# Patient Record
Sex: FEMALE | Race: WHITE | NOT HISPANIC OR LATINO | Employment: UNEMPLOYED | URBAN - METROPOLITAN AREA
[De-identification: names, ages, dates, MRNs, and addresses within clinical notes are randomized per-mention and may not be internally consistent; named-entity substitution may affect disease eponyms.]

---

## 2023-07-15 ENCOUNTER — HOSPITAL ENCOUNTER (EMERGENCY)
Facility: HOSPITAL | Age: 48
Discharge: HOME | End: 2023-07-15
Attending: EMERGENCY MEDICINE
Payer: COMMERCIAL

## 2023-07-15 VITALS
SYSTOLIC BLOOD PRESSURE: 156 MMHG | DIASTOLIC BLOOD PRESSURE: 89 MMHG | HEIGHT: 64 IN | BODY MASS INDEX: 41.83 KG/M2 | TEMPERATURE: 98 F | WEIGHT: 245 LBS | HEART RATE: 91 BPM | RESPIRATION RATE: 18 BRPM | OXYGEN SATURATION: 100 %

## 2023-07-15 DIAGNOSIS — L02.619 CELLULITIS AND ABSCESS OF FOOT: Primary | ICD-10-CM

## 2023-07-15 DIAGNOSIS — L03.119 CELLULITIS AND ABSCESS OF FOOT: Primary | ICD-10-CM

## 2023-07-15 DIAGNOSIS — I10 HYPERTENSION: ICD-10-CM

## 2023-07-15 DIAGNOSIS — M79.673 FOOT PAIN: ICD-10-CM

## 2023-07-15 LAB
ALBUMIN SERPL BCP-MCNC: 4.4 G/DL (ref 3.5–5.2)
ALP SERPL-CCNC: 89 U/L (ref 55–135)
ALT SERPL W/O P-5'-P-CCNC: 18 U/L (ref 10–44)
ANION GAP SERPL CALC-SCNC: 4 MMOL/L (ref 8–16)
AST SERPL-CCNC: 17 U/L (ref 10–40)
BASOPHILS # BLD AUTO: 0.09 K/UL (ref 0–0.2)
BASOPHILS NFR BLD: 0.7 % (ref 0–1.9)
BILIRUB SERPL-MCNC: 0.3 MG/DL (ref 0.1–1)
BILIRUB UR QL STRIP: NEGATIVE
BUN SERPL-MCNC: 16 MG/DL (ref 6–20)
CALCIUM SERPL-MCNC: 8.9 MG/DL (ref 8.7–10.5)
CHLORIDE SERPL-SCNC: 103 MMOL/L (ref 95–110)
CLARITY UR: CLEAR
CO2 SERPL-SCNC: 29 MMOL/L (ref 23–29)
COLOR UR: YELLOW
CREAT SERPL-MCNC: 0.7 MG/DL (ref 0.5–1.4)
DIFFERENTIAL METHOD: ABNORMAL
EOSINOPHIL # BLD AUTO: 0.3 K/UL (ref 0–0.5)
EOSINOPHIL NFR BLD: 2.5 % (ref 0–8)
ERYTHROCYTE [DISTWIDTH] IN BLOOD BY AUTOMATED COUNT: 13.2 % (ref 11.5–14.5)
EST. GFR  (NO RACE VARIABLE): >60 ML/MIN/1.73 M^2
GLUCOSE SERPL-MCNC: 108 MG/DL (ref 70–110)
GLUCOSE SERPL-MCNC: 113 MG/DL (ref 70–110)
GLUCOSE UR QL STRIP: NEGATIVE
HCT VFR BLD AUTO: 43.5 % (ref 37–48.5)
HGB BLD-MCNC: 14 G/DL (ref 12–16)
HGB UR QL STRIP: NEGATIVE
IMM GRANULOCYTES # BLD AUTO: 0.06 K/UL (ref 0–0.04)
IMM GRANULOCYTES NFR BLD AUTO: 0.5 % (ref 0–0.5)
INFLUENZA A, MOLECULAR: NEGATIVE
INFLUENZA B, MOLECULAR: NEGATIVE
KETONES UR QL STRIP: NEGATIVE
LACTATE SERPL-SCNC: 1.1 MMOL/L (ref 0.5–1.9)
LEUKOCYTE ESTERASE UR QL STRIP: NEGATIVE
LYMPHOCYTES # BLD AUTO: 3.3 K/UL (ref 1–4.8)
LYMPHOCYTES NFR BLD: 26 % (ref 18–48)
MAGNESIUM SERPL-MCNC: 1.8 MG/DL (ref 1.6–2.6)
MCH RBC QN AUTO: 28.9 PG (ref 27–31)
MCHC RBC AUTO-ENTMCNC: 32.2 G/DL (ref 32–36)
MCV RBC AUTO: 90 FL (ref 82–98)
MONOCYTES # BLD AUTO: 1 K/UL (ref 0.3–1)
MONOCYTES NFR BLD: 7.8 % (ref 4–15)
NEUTROPHILS # BLD AUTO: 8 K/UL (ref 1.8–7.7)
NEUTROPHILS NFR BLD: 62.5 % (ref 38–73)
NITRITE UR QL STRIP: NEGATIVE
NRBC BLD-RTO: 0 /100 WBC
PH UR STRIP: 6 [PH] (ref 5–8)
PLATELET # BLD AUTO: 284 K/UL (ref 150–450)
PMV BLD AUTO: 10.8 FL (ref 9.2–12.9)
POTASSIUM SERPL-SCNC: 3.9 MMOL/L (ref 3.5–5.1)
PROT SERPL-MCNC: 8.1 G/DL (ref 6–8.4)
PROT UR QL STRIP: ABNORMAL
RBC # BLD AUTO: 4.85 M/UL (ref 4–5.4)
SARS-COV-2 RDRP RESP QL NAA+PROBE: NEGATIVE
SODIUM SERPL-SCNC: 136 MMOL/L (ref 136–145)
SP GR UR STRIP: >1.03 (ref 1–1.03)
SPECIMEN SOURCE: NORMAL
TROPONIN I SERPL HS-MCNC: 2.3 PG/ML (ref 0–14.9)
TSH SERPL DL<=0.005 MIU/L-ACNC: 3.18 UIU/ML (ref 0.34–5.6)
URN SPEC COLLECT METH UR: ABNORMAL
UROBILINOGEN UR STRIP-ACNC: NEGATIVE EU/DL
WBC # BLD AUTO: 12.8 K/UL (ref 3.9–12.7)

## 2023-07-15 PROCEDURE — 83735 ASSAY OF MAGNESIUM: CPT | Performed by: EMERGENCY MEDICINE

## 2023-07-15 PROCEDURE — 87186 SC STD MICRODIL/AGAR DIL: CPT | Performed by: EMERGENCY MEDICINE

## 2023-07-15 PROCEDURE — 87147 CULTURE TYPE IMMUNOLOGIC: CPT | Performed by: EMERGENCY MEDICINE

## 2023-07-15 PROCEDURE — 80053 COMPREHEN METABOLIC PANEL: CPT | Performed by: EMERGENCY MEDICINE

## 2023-07-15 PROCEDURE — 84443 ASSAY THYROID STIM HORMONE: CPT | Performed by: EMERGENCY MEDICINE

## 2023-07-15 PROCEDURE — 87077 CULTURE AEROBIC IDENTIFY: CPT | Performed by: EMERGENCY MEDICINE

## 2023-07-15 PROCEDURE — 87502 INFLUENZA DNA AMP PROBE: CPT | Performed by: EMERGENCY MEDICINE

## 2023-07-15 PROCEDURE — 87040 BLOOD CULTURE FOR BACTERIA: CPT | Performed by: EMERGENCY MEDICINE

## 2023-07-15 PROCEDURE — 84484 ASSAY OF TROPONIN QUANT: CPT | Performed by: EMERGENCY MEDICINE

## 2023-07-15 PROCEDURE — 93005 ELECTROCARDIOGRAM TRACING: CPT | Performed by: INTERNAL MEDICINE

## 2023-07-15 PROCEDURE — 93010 EKG 12-LEAD: ICD-10-PCS | Mod: ,,, | Performed by: INTERNAL MEDICINE

## 2023-07-15 PROCEDURE — 36415 COLL VENOUS BLD VENIPUNCTURE: CPT | Performed by: EMERGENCY MEDICINE

## 2023-07-15 PROCEDURE — 99285 EMERGENCY DEPT VISIT HI MDM: CPT | Mod: 25

## 2023-07-15 PROCEDURE — 81003 URINALYSIS AUTO W/O SCOPE: CPT | Performed by: EMERGENCY MEDICINE

## 2023-07-15 PROCEDURE — 85025 COMPLETE CBC W/AUTO DIFF WBC: CPT | Performed by: EMERGENCY MEDICINE

## 2023-07-15 PROCEDURE — 93010 ELECTROCARDIOGRAM REPORT: CPT | Mod: ,,, | Performed by: INTERNAL MEDICINE

## 2023-07-15 PROCEDURE — 83605 ASSAY OF LACTIC ACID: CPT | Performed by: EMERGENCY MEDICINE

## 2023-07-15 PROCEDURE — 87070 CULTURE OTHR SPECIMN AEROBIC: CPT | Performed by: EMERGENCY MEDICINE

## 2023-07-15 PROCEDURE — U0002 COVID-19 LAB TEST NON-CDC: HCPCS | Performed by: EMERGENCY MEDICINE

## 2023-07-15 PROCEDURE — 25000003 PHARM REV CODE 250: Performed by: EMERGENCY MEDICINE

## 2023-07-15 PROCEDURE — 96374 THER/PROPH/DIAG INJ IV PUSH: CPT

## 2023-07-15 PROCEDURE — 82962 GLUCOSE BLOOD TEST: CPT

## 2023-07-15 PROCEDURE — 63600175 PHARM REV CODE 636 W HCPCS: Performed by: EMERGENCY MEDICINE

## 2023-07-15 RX ORDER — DOXYCYCLINE 100 MG/1
100 CAPSULE ORAL EVERY 12 HOURS
Status: DISCONTINUED | OUTPATIENT
Start: 2023-07-15 | End: 2023-07-15 | Stop reason: HOSPADM

## 2023-07-15 RX ORDER — GABAPENTIN 300 MG/1
300 CAPSULE ORAL 3 TIMES DAILY
Status: DISCONTINUED | OUTPATIENT
Start: 2023-07-15 | End: 2023-07-15

## 2023-07-15 RX ORDER — GABAPENTIN 300 MG/1
300 CAPSULE ORAL ONCE
Status: COMPLETED | OUTPATIENT
Start: 2023-07-15 | End: 2023-07-15

## 2023-07-15 RX ORDER — DOXYCYCLINE 100 MG/1
100 CAPSULE ORAL 2 TIMES DAILY
Qty: 20 CAPSULE | Refills: 0 | Status: SHIPPED | OUTPATIENT
Start: 2023-07-15 | End: 2023-07-25

## 2023-07-15 RX ORDER — GABAPENTIN 300 MG/1
300 CAPSULE ORAL NIGHTLY
Qty: 30 CAPSULE | Refills: 0 | Status: SHIPPED | OUTPATIENT
Start: 2023-07-15 | End: 2023-08-30

## 2023-07-15 RX ORDER — LORAZEPAM 2 MG/ML
0.5 INJECTION INTRAMUSCULAR
Status: COMPLETED | OUTPATIENT
Start: 2023-07-15 | End: 2023-07-15

## 2023-07-15 RX ADMIN — LORAZEPAM 0.5 MG: 2 INJECTION INTRAMUSCULAR; INTRAVENOUS at 10:07

## 2023-07-15 RX ADMIN — DOXYCYCLINE HYCLATE 100 MG: 100 CAPSULE ORAL at 12:07

## 2023-07-15 RX ADMIN — GABAPENTIN 300 MG: 300 CAPSULE ORAL at 12:07

## 2023-07-15 NOTE — ED PROVIDER NOTES
Encounter Date: 7/15/2023       History     Chief Complaint   Patient presents with    FOOT WOUND     BOTTOM OF LEFT FOOT     Patient has not seen a doctor in at least 8 years.  Patient reports 2 day history of fever up to 101.5° F.  Patient has been having burning sensation with wound to her left foot.  No known foreign body.  Patient does report serous drainage from foot ankle area.  She has burning pain to area.  Patient noted to be hypertensive at triage.  Patient adamantly denies any chest pain, shortness of breath change in urination abdominal pain dysuria or any other systemic symptoms besides fever and chills.    Review of patient's allergies indicates:   Allergen Reactions    Mobic [meloxicam]     Pcn [penicillins]     Sulfa (sulfonamide antibiotics)      No past medical history on file.  No past surgical history on file.  No family history on file.     Review of Systems   Constitutional:  Positive for chills and fever.   HENT:  Negative for congestion.    Eyes:  Negative for visual disturbance.   Respiratory:  Negative for shortness of breath.    Cardiovascular:  Negative for chest pain and palpitations.   Gastrointestinal:  Negative for abdominal pain and vomiting.   Genitourinary:  Negative for dysuria.   Musculoskeletal:  Negative for joint swelling.   Skin:  Positive for wound.   Neurological:  Negative for headaches.   Psychiatric/Behavioral:  Negative for confusion.      Physical Exam     Initial Vitals [07/15/23 0940]   BP Pulse Resp Temp SpO2   (!) 200/121 104 18 98 °F (36.7 °C) 98 %      MAP       --         Physical Exam    Nursing note and vitals reviewed.  Constitutional: She is not diaphoretic. No distress.   HENT:   Head: Normocephalic and atraumatic.   Eyes: Conjunctivae are normal.   Neck:   Normal range of motion.  Cardiovascular:  Normal rate.           Pulmonary/Chest: Breath sounds normal.   Abdominal: Abdomen is soft. There is no abdominal tenderness.   Musculoskeletal:          General: Normal range of motion.      Cervical back: Normal range of motion.      Comments: All extremities are neurovascular intact.  Both feet with 2+ dorsalis pedis posterior tibial pulses.  Capillary refill less than 2 seconds.  Full range of motion.  Trace bilateral pretibial and ankle edema.  Patient does have bilateral medial malleolus with significant superficial veins consistent with venous insufficiency.  Left foot with callus at 1st metatarsal phalangeal area with slight surrounding erythema.  Palpation causes pain.  There are several bullae to plantar foot.  No lymphangitis.     Neurological: She is alert. She has normal strength. No cranial nerve deficit or sensory deficit.   No gross deficits   Skin: No rash noted.   Psychiatric:   Pleasant and anxious       ED Course   Procedures  Labs Reviewed   URINALYSIS, REFLEX TO URINE CULTURE - Abnormal; Notable for the following components:       Result Value    Specific Gravity, UA >1.030 (*)     Protein, UA Trace (*)     All other components within normal limits    Narrative:     Specimen Source->Urine   CBC W/ AUTO DIFFERENTIAL - Abnormal; Notable for the following components:    WBC 12.80 (*)     Gran # (ANC) 8.0 (*)     Immature Grans (Abs) 0.06 (*)     All other components within normal limits   COMPREHENSIVE METABOLIC PANEL - Abnormal; Notable for the following components:    Glucose 113 (*)     Anion Gap 4 (*)     All other components within normal limits   CULTURE, BLOOD   CULTURE, BLOOD   CULTURE, AEROBIC  (SPECIFY SOURCE)   LACTIC ACID, PLASMA   TSH   INFLUENZA A AND B ANTIGEN    Narrative:     Specimen Source->Nasopharyngeal Swab   SARS-COV-2 RNA AMPLIFICATION, QUAL   MAGNESIUM   TROPONIN I HIGH SENSITIVITY   POCT GLUCOSE   POCT GLUCOSE MONITORING CONTINUOUS   POCT GLUCOSE MONITORING CONTINUOUS        ECG Results              EKG 12-lead (In process)  Result time 07/15/23 10:39:16      In process by Interface, Lab In [facility] (07/15/23 10:39:16)                    Narrative:    Test Reason : I10,    Vent. Rate : 089 BPM     Atrial Rate : 089 BPM     P-R Int : 154 ms          QRS Dur : 070 ms      QT Int : 356 ms       P-R-T Axes : 048 060 044 degrees     QTc Int : 433 ms    Normal sinus rhythm  Low voltage QRS  Septal infarct ,age undetermined  Abnormal ECG  No previous ECGs available    Referred By: [email or web address]   SELF           Confirmed By:                                   Imaging Results              X-Ray Foot Complete Left (Final result)  Result time 07/15/23 10:42:42      Final result by Liang Horan MD (07/15/23 10:42:42)                   Narrative:    HISTORY: Left foot pain.    FINDINGS: 3 views of the left foot show no acute fracture, dislocation or destructive osseous lesion. The joint spaces are preserved. Bone mineralization is normal, with no soft tissue abnormalities or radiopaque foreign bodies seen.    IMPRESSION: Negative left foot radiographs.    Electronically signed by:  Liang Horan MD  7/15/2023 10:42 AM CDT Workstation: [id number]                                     X-Ray Chest AP Portable (Final result)  Result time 07/15/23 10:41:44      Final result by Liang Horan MD (07/15/23 10:41:44)                   Narrative:    HISTORY: Hypertension    FINDINGS: Portable chest radiograph at 1015 hours with no prior studies for comparison shows the cardiomediastinal silhouette and pulmonary vasculature are within normal limits.    The lungs are normally expanded, with no consolidation, large pleural effusion, or evidence of pulmonary edema. No confluent infiltrates or pneumothorax. There are no significant osseous abnormalities.    IMPRESSION: No evidence of active cardiopulmonary disease.    Electronically signed by:  Liang Horan MD  7/15/2023 10:41 AM CDT Workstation: [id number]                                     Medications   doxycycline capsule 100 mg (100 mg Oral Given 7/15/23 1228)   LORazepam injection 0.5 mg (0.5 mg Intravenous  Given 7/15/23 1001)   gabapentin capsule 300 mg (300 mg Oral Given 7/15/23 1233)     Medical Decision Making:   History:   Old Medical Records: I decided to obtain old medical records.  Old Records Summarized: records from clinic visits and records from previous admission(s).       <> Summary of Records: No medical records for the last 7 years on epic  Differential Diagnosis:   Foot cellulitis, neuropathy accelerated hypertension, end-organ dysfunction  Independently Interpreted Test(s):   I have ordered and independently interpreted X-rays - see summary below.       <> Summary of X-Ray Reading(s): No foreign body my no acute cardiopulmonary process  I have ordered and independently interpreted EKG Reading(s) - see summary below       <> Summary of EKG Reading(s): Normal sinus rhythm no EKG criteria for LVH  Clinical Tests:   Lab Tests: Reviewed  The following lab test(s) were unremarkable: CBC, CMP and Troponin  Radiological Study: Reviewed  Medical Tests: Reviewed  ED Management:  Patient presents with fever and pain to foot.  Patient does have likely some cellulitis around callus of the 1st metatarsophalangeal area.  Will refer to Podiatry.  Patient has accelerated hypertension no end-organ damage.  After treatment for some white coat anxiety blood pressure much improved.  His 158/90.  Stat referral to [facility] for repeat blood pressure wound check given.  Will begin doxycycline.                        Clinical Impression:   Final diagnoses:  [I10] Hypertension  [M79.673] Foot pain  [L03.119, L02.619] Cellulitis and abscess of foot (Primary)               Jerrell Lee MD  07/15/23 1233

## 2023-07-17 ENCOUNTER — OFFICE VISIT (OUTPATIENT)
Dept: PODIATRY | Facility: CLINIC | Age: 48
End: 2023-07-17
Payer: COMMERCIAL

## 2023-07-17 VITALS — HEART RATE: 95 BPM | BODY MASS INDEX: 42.05 KG/M2 | WEIGHT: 245 LBS | OXYGEN SATURATION: 96 %

## 2023-07-17 DIAGNOSIS — M79.672 LEFT FOOT PAIN: ICD-10-CM

## 2023-07-17 DIAGNOSIS — L03.116 CELLULITIS OF LEFT FOOT: ICD-10-CM

## 2023-07-17 DIAGNOSIS — R26.2 DIFFICULTY IN WALKING, NOT ELSEWHERE CLASSIFIED: ICD-10-CM

## 2023-07-17 DIAGNOSIS — B35.3 TINEA PEDIS OF LEFT FOOT: Primary | ICD-10-CM

## 2023-07-17 DIAGNOSIS — M79.89 SWELLING OF LEFT FOOT: ICD-10-CM

## 2023-07-17 DIAGNOSIS — I87.2 VENOUS INSUFFICIENCY: ICD-10-CM

## 2023-07-17 PROCEDURE — 99999 PR PBB SHADOW E&M-EST. PATIENT-LVL III: ICD-10-PCS | Mod: PBBFAC,,, | Performed by: PODIATRIST

## 2023-07-17 PROCEDURE — 99999 PR PBB SHADOW E&M-EST. PATIENT-LVL III: CPT | Mod: PBBFAC,,, | Performed by: PODIATRIST

## 2023-07-17 PROCEDURE — 1159F MED LIST DOCD IN RCRD: CPT | Mod: CPTII,,, | Performed by: PODIATRIST

## 2023-07-17 PROCEDURE — 3008F PR BODY MASS INDEX (BMI) DOCUMENTED: ICD-10-PCS | Mod: CPTII,,, | Performed by: PODIATRIST

## 2023-07-17 PROCEDURE — 1160F RVW MEDS BY RX/DR IN RCRD: CPT | Mod: CPTII,,, | Performed by: PODIATRIST

## 2023-07-17 PROCEDURE — 99213 OFFICE O/P EST LOW 20 MIN: CPT | Mod: PBBFAC,PN | Performed by: PODIATRIST

## 2023-07-17 PROCEDURE — 99204 PR OFFICE/OUTPT VISIT, NEW, LEVL IV, 45-59 MIN: ICD-10-PCS | Mod: S$PBB,,, | Performed by: PODIATRIST

## 2023-07-17 PROCEDURE — 99204 OFFICE O/P NEW MOD 45 MIN: CPT | Mod: S$PBB,,, | Performed by: PODIATRIST

## 2023-07-17 PROCEDURE — 3008F BODY MASS INDEX DOCD: CPT | Mod: CPTII,,, | Performed by: PODIATRIST

## 2023-07-17 PROCEDURE — 1160F PR REVIEW ALL MEDS BY PRESCRIBER/CLIN PHARMACIST DOCUMENTED: ICD-10-PCS | Mod: CPTII,,, | Performed by: PODIATRIST

## 2023-07-17 PROCEDURE — 1159F PR MEDICATION LIST DOCUMENTED IN MEDICAL RECORD: ICD-10-PCS | Mod: CPTII,,, | Performed by: PODIATRIST

## 2023-07-17 RX ORDER — TERBINAFINE HYDROCHLORIDE 250 MG/1
250 TABLET ORAL DAILY
Qty: 14 TABLET | Refills: 0 | Status: SHIPPED | OUTPATIENT
Start: 2023-07-17 | End: 2023-07-25

## 2023-07-17 RX ORDER — HYDROCODONE BITARTRATE AND ACETAMINOPHEN 5; 325 MG/1; MG/1
1 TABLET ORAL EVERY 6 HOURS PRN
Qty: 28 TABLET | Refills: 0 | Status: SHIPPED | OUTPATIENT
Start: 2023-07-17 | End: 2023-07-25

## 2023-07-17 NOTE — PATIENT INSTRUCTIONS
Athletes Foot    Athletes foot (tinea pedis) is caused by a fungal infection in the skin. It affects the skin between the toes, causing cracks in the skin called fissures. It can also affect the bottom of the foot where it causes dry white scales and peeling of the skin. This infection is more likely to occur when the foot is in hot, sweaty socks and shoes for long periods of time. You may feel itching and burning between your toes. This infection is treated with skin creams or medicine taken by mouth.    Home care  The following are general care guidelines:  It is important to keep the feet dry. Use absorbent cotton socks and change them if they become sweaty. Or wear an open-toe shoe or sandal. Wash the feet at least once a day with soap and water.  Apply the antifungal cream as prescribed. Some antifungal creams are available without a prescription.  It may take a week before the rash starts to improve. It can take about 3 to 4 weeks to completely clear. Continue the medicine until the rash is all gone.  Use over-the-counter antifungal powders or sprays on your feet after exposure to high-risk environments, such as public showers, gyms, and locker rooms. This can help prevent future infections. Wearing appropriate shoes in these situations can help.    Prevention  The following tips may help prevent athletes foot:  Don't share shoes or socks with someone who has athlete's foot.  Don't walk barefoot in places where a fungal infection can spread quickly such as locker rooms, showers, and swimming pools.  Change socks regularly.  Alternate shoes to assist in drying.    Follow-up care  Follow up with your healthcare provider as recommended if the rash does not improve after 10 days of treatment, or if the rash continues to spread.    When to seek medical care  Get medical attention right away if any of the following occur:  Fever of 100.4°F (38°C) or higher, or as directed  Increasing redness or swelling of the  foot  Infection comes back soon after treatment  Pus draining from cracks in the skin    Date Last Reviewed: 8/1/2016  © 8466-8153 [email or web address] [address] [place] [address], [place] [address]. All rights reserved. This information is not intended as a substitute for professional medical care. Always follow your healthcare professional's instructions.

## 2023-07-17 NOTE — PROGRESS NOTES
"  [address] [facility] [address]  [place] [address]  Phone: [phone]   [phone]    Patient's PCP:Primary Doctor No  Referring Provider: Dept Physician Emergency    Subjective:      Chief Complaint:: Foot Problem (Blister like wet patches on bottom of left foot )    HPI  Eliezer Holm is a 47 y.o. female who presents today with a complaint of pain in left foot "Blister like wet patches" on bottom. The current episode started the 10th of july, popped a blister.  The symptoms include stabbing pain and burning slight redness leaking yellow pus like fluid. Probable cause of complaint popping blister.  The symptoms are aggravated by pressure. The problem has worsened. Treatment to date have included doxy, gabapentin, and duo derm ointment which provided no relief. Also has swelling and dryness on feet.        Vitals:    07/17/23 1150   Pulse: 95   SpO2: 96%   Weight: 111.1 kg (245 lb)   PainSc:   8      Shoe Size: 7.5-8    Past Surgical History:   Procedure Laterality Date    HYSTERECTOMY      partical     History reviewed. No pertinent past medical history.  History reviewed. No pertinent family history.     Social History:   Marital Status: Legally   Alcohol History:  has no history on file for alcohol use.  Tobacco History:  has no history on file for tobacco use.  Drug History:  has no history on file for drug use.    Review of patient's allergies indicates:   Allergen Reactions    Mobic [meloxicam]     Pcn [penicillins]     Sulfa (sulfonamide antibiotics)        Current Outpatient Medications   Medication Sig Dispense Refill    doxycycline (VIBRAMYCIN) 100 MG Cap Take 1 capsule (100 mg total) by mouth 2 (two) times daily. for 10 days 20 capsule 0    gabapentin (NEURONTIN) 300 MG capsule Take 1 capsule (300 mg total) by mouth every evening. 30 capsule 0    HYDROcodone-acetaminophen (NORCO) 5-325 mg per tablet Take 1 tablet by mouth every 6 (six) hours as needed for Pain. 28 tablet 0    ibuprofen " (ADVIL,MOTRIN) 800 MG tablet Take 1 tablet (800 mg total) by mouth 3 (three) times daily as needed. (Patient not taking: Reported on 7/17/2023) 20 tablet 0    terbinafine HCL (LAMISIL) 250 mg tablet Take 1 tablet (250 mg total) by mouth once daily. for 14 days 14 tablet 0     No current facility-administered medications for this visit.       Review of Systems   Constitutional:  Negative for chills, fatigue, fever and unexpected weight change.   HENT:  Negative for hearing loss and trouble swallowing.    Eyes:  Negative for photophobia and visual disturbance.   Respiratory:  Negative for cough, shortness of breath and wheezing.    Cardiovascular:  Negative for chest pain, palpitations and leg swelling.   Gastrointestinal:  Negative for abdominal pain and nausea.   Genitourinary:  Negative for dysuria and frequency.   Musculoskeletal:  Positive for gait problem. Negative for arthralgias, back pain, joint swelling and myalgias.   Skin:  Positive for color change. Negative for rash and wound.   Neurological:  Negative for tremors, seizures, speech difficulty, weakness and headaches.   Hematological:  Does not bruise/bleed easily.       Objective:        Physical Exam:   Foot Exam    General  General Appearance: appears stated age and healthy   Orientation: alert and oriented to person, place, and time   Affect: appropriate   Gait: antalgic       Left Foot/Ankle      Inspection and Palpation  Ecchymosis: none  Tenderness: (Plantar foot)  Swelling: (Mild lower extremity edema)  Arch: normal  Hammertoes: absent  Claw toes: absent  Hallux valgus: no  Hallux limitus: no  Skin Exam: drainage, maceration, tinea and skin changes; no ulcer and no erythema   Neurovascular  Dorsalis pedis: 2+  Posterior tibial: 2+  Capillary refill: 2+  Varicose veins: present  Saphenous nerve sensation: normal  Tibial nerve sensation: normal  Superficial peroneal nerve sensation: normal  Deep peroneal nerve sensation: normal  Sural nerve  sensation: normal    Muscle Strength  Ankle dorsiflexion: 5  Ankle plantar flexion: 5  Ankle inversion: 5  Ankle eversion: 5  Great toe extension: 5  Great toe flexion: 5    Range of Motion    Normal left ankle ROM    Tests  Anterior drawer: negative   Talar tilt: negative   PT Tinel's sign: negative  Paresthesia: negative  Comments  There is a central ruptured blister extending to dermis without signs of infection.  There are several scattered vesicles as well as some redness and peeling along the plantar foot  Physical Exam  Cardiovascular:      Pulses:           Dorsalis pedis pulses are 2+ on the left side.        Posterior tibial pulses are 2+ on the left side.   Musculoskeletal:      Left foot: No bunion.   Feet:      Left foot:      Skin integrity: Skin breakdown present. No ulcer or erythema.             Left Ankle/Foot Exam     Range of Motion   The patient has normal left ankle ROM.     Comments:  There is a central ruptured blister extending to dermis without signs of infection.  There are several scattered vesicles as well as some redness and peeling along the plantar foot      Muscle Strength   Left Lower Extremity   Ankle Dorsiflexion:  5   Plantar flexion:  5/5     Vascular Exam       Left Pulses  Dorsalis Pedis:      2+  Posterior Tibial:      2+         Imaging: X-Ray Foot Complete Left  HISTORY: Left foot pain.    FINDINGS: 3 views of the left foot show no acute fracture, dislocation or destructive osseous lesion. The joint spaces are preserved. Bone mineralization is normal, with no soft tissue abnormalities or radiopaque foreign bodies seen.    IMPRESSION: Negative left foot radiographs.    Electronically signed by:  Liang Horan MD  7/15/2023 10:42 AM CDT Workstation: [id number]  X-Ray Chest AP Portable  HISTORY: Hypertension    FINDINGS: Portable chest radiograph at 1015 hours with no prior studies for comparison shows the cardiomediastinal silhouette and pulmonary vasculature are within normal  limits.    The lungs are normally expanded, with no consolidation, large pleural effusion, or evidence of pulmonary edema. No confluent infiltrates or pneumothorax. There are no significant osseous abnormalities.    IMPRESSION: No evidence of active cardiopulmonary disease.    Electronically signed by:  Liang Horan MD  7/15/2023 10:41 AM CDT Workstation: [id number]               Assessment:       1. Tinea pedis of left foot    2. Venous insufficiency    3. Left foot pain    4. Cellulitis of left foot    5. Swelling of left foot    6. Difficulty in walking, not elsewhere classified      Plan:   Tinea pedis of left foot  -     terbinafine HCL (LAMISIL) 250 mg tablet; Take 1 tablet (250 mg total) by mouth once daily. for 14 days  Dispense: 14 tablet; Refill: 0  -     Ambulatory referral/consult to Dermatology; Future; Expected date: 07/24/2023    Venous insufficiency  -     WHEELCHAIR FOR HOME USE    Left foot pain  -     Ambulatory referral/consult to Dermatology; Future; Expected date: 07/24/2023  -     HYDROcodone-acetaminophen (NORCO) 5-325 mg per tablet; Take 1 tablet by mouth every 6 (six) hours as needed for Pain.  Dispense: 28 tablet; Refill: 0  -     WHEELCHAIR FOR HOME USE  -     SURGERY SHOE FOR HOME USE    Cellulitis of left foot  -     WHEELCHAIR FOR HOME USE    Swelling of left foot  -     WHEELCHAIR FOR HOME USE    Difficulty in walking, not elsewhere classified  -     WHEELCHAIR FOR HOME USE      Follow up if symptoms worsen or fail to improve.    Procedures        I discussed with the patient that this appears to be a significant case of tinea pedis.  At this time I am going to send in oral Lamisil for her.  She will take this once daily for the next 2 weeks.  I recommend that she continue with the antibiotics prescribed by the emergency doctor.  I also recommend that she dress the foot daily with gentian violet.  I am placing a referral to Dermatology for further evaluation.    Counseling:     I  provided patient education verbally regarding:   Patient diagnosis, treatment options, as well as alternatives, risks, and benefits.     This note was created using Dragon voice recognition software that occasionally misinterpreted phrases or words.

## 2023-07-18 ENCOUNTER — TELEPHONE (OUTPATIENT)
Dept: PODIATRY | Facility: CLINIC | Age: 48
End: 2023-07-18
Payer: COMMERCIAL

## 2023-07-18 LAB — BACTERIA SPEC AEROBE CULT: ABNORMAL

## 2023-07-18 NOTE — TELEPHONE ENCOUNTER
----- Message from Liz Corona sent at 7/18/2023 11:18 AM CDT -----  Contact: self  Type: Patient Returning Call        Who Called: Patient   Who Left Message for Patient: Nurse Sri   Does the patient know what this is regarding?: yes  Best Call Back Number: [phone] (home)   Additional Information: Plz call pt back to get scheduled. Thanks

## 2023-07-19 ENCOUNTER — TELEPHONE (OUTPATIENT)
Dept: PODIATRY | Facility: CLINIC | Age: 48
End: 2023-07-19
Payer: COMMERCIAL

## 2023-07-19 NOTE — TELEPHONE ENCOUNTER
Derm referral: Cancelled: No portal access. spoke with patient notifying insurance is out of network. Discussed contacting insurance for provider names that are in-network and contact directly. If referral needed to contact PCP/whichever provider ordered referral. Patient voices understanding.

## 2023-07-19 NOTE — PATIENT INSTRUCTIONS
Athletes Foot    Athletes foot (tinea pedis) is caused by a fungal infection in the skin. It affects the skin between the toes, causing cracks in the skin called fissures. It can also affect the bottom of the foot where it causes dry white scales and peeling of the skin. This infection is more likely to occur when the foot is in hot, sweaty socks and shoes for long periods of time. You may feel itching and burning between your toes. This infection is treated with skin creams or medicine taken by mouth.    Home care  The following are general care guidelines:  It is important to keep the feet dry. Use absorbent cotton socks and change them if they become sweaty. Or wear an open-toe shoe or sandal. Wash the feet at least once a day with soap and water.  Apply the antifungal cream as prescribed. Some antifungal creams are available without a prescription.  It may take a week before the rash starts to improve. It can take about 3 to 4 weeks to completely clear. Continue the medicine until the rash is all gone.  Use over-the-counter antifungal powders or sprays on your feet after exposure to high-risk environments, such as public showers, gyms, and locker rooms. This can help prevent future infections. Wearing appropriate shoes in these situations can help.    Prevention  The following tips may help prevent athletes foot:  Don't share shoes or socks with someone who has athlete's foot.  Don't walk barefoot in places where a fungal infection can spread quickly such as locker rooms, showers, and swimming pools.  Change socks regularly.  Alternate shoes to assist in drying.    Follow-up care  Follow up with your healthcare provider as recommended if the rash does not improve after 10 days of treatment, or if the rash continues to spread.    When to seek medical care  Get medical attention right away if any of the following occur:  Fever of 100.4°F (38°C) or higher, or as directed  Increasing redness or swelling of the  foot  Infection comes back soon after treatment  Pus draining from cracks in the skin    Date Last Reviewed: 8/1/2016  © 8405-4936 [employer] [address] [place] [address], [place] [address]. All rights reserved. This information is not intended as a substitute for professional medical care. Always follow your healthcare professional's instructions.

## 2023-07-20 LAB
BACTERIA BLD CULT: NORMAL
BACTERIA BLD CULT: NORMAL

## 2023-07-24 ENCOUNTER — HOSPITAL ENCOUNTER (EMERGENCY)
Facility: HOSPITAL | Age: 48
Discharge: HOME | End: 2023-07-24
Attending: STUDENT IN AN ORGANIZED HEALTH CARE EDUCATION/TRAINING PROGRAM
Payer: COMMERCIAL

## 2023-07-24 VITALS
HEIGHT: 64 IN | OXYGEN SATURATION: 99 % | WEIGHT: 245 LBS | SYSTOLIC BLOOD PRESSURE: 171 MMHG | TEMPERATURE: 98 F | RESPIRATION RATE: 16 BRPM | DIASTOLIC BLOOD PRESSURE: 103 MMHG | HEART RATE: 82 BPM | BODY MASS INDEX: 41.83 KG/M2

## 2023-07-24 DIAGNOSIS — M79.673 FOOT PAIN: ICD-10-CM

## 2023-07-24 DIAGNOSIS — Z51.89 ENCOUNTER FOR WOUND RE-CHECK: Primary | ICD-10-CM

## 2023-07-24 DIAGNOSIS — L03.116 CELLULITIS OF LEFT LOWER EXTREMITY: ICD-10-CM

## 2023-07-24 LAB
ALBUMIN SERPL BCP-MCNC: 4 G/DL (ref 3.5–5.2)
ALP SERPL-CCNC: 113 U/L (ref 55–135)
ALT SERPL W/O P-5'-P-CCNC: 24 U/L (ref 10–44)
ANION GAP SERPL CALC-SCNC: 7 MMOL/L (ref 8–16)
AST SERPL-CCNC: 20 U/L (ref 10–40)
BASOPHILS # BLD AUTO: 0.1 K/UL (ref 0–0.2)
BASOPHILS NFR BLD: 0.8 % (ref 0–1.9)
BILIRUB SERPL-MCNC: 0.3 MG/DL (ref 0.1–1)
BUN SERPL-MCNC: 13 MG/DL (ref 6–20)
CALCIUM SERPL-MCNC: 9.2 MG/DL (ref 8.7–10.5)
CHLORIDE SERPL-SCNC: 104 MMOL/L (ref 95–110)
CO2 SERPL-SCNC: 31 MMOL/L (ref 23–29)
CREAT SERPL-MCNC: 0.8 MG/DL (ref 0.5–1.4)
CRP SERPL-MCNC: 1.18 MG/DL
DIFFERENTIAL METHOD: ABNORMAL
EOSINOPHIL # BLD AUTO: 0.3 K/UL (ref 0–0.5)
EOSINOPHIL NFR BLD: 2.6 % (ref 0–8)
ERYTHROCYTE [DISTWIDTH] IN BLOOD BY AUTOMATED COUNT: 13.4 % (ref 11.5–14.5)
EST. GFR  (NO RACE VARIABLE): >60 ML/MIN/1.73 M^2
GLUCOSE SERPL-MCNC: 100 MG/DL (ref 70–110)
GROUP A STREP, MOLECULAR: NEGATIVE
HCT VFR BLD AUTO: 46 % (ref 37–48.5)
HGB BLD-MCNC: 14.4 G/DL (ref 12–16)
IMM GRANULOCYTES # BLD AUTO: 0.05 K/UL (ref 0–0.04)
IMM GRANULOCYTES NFR BLD AUTO: 0.4 % (ref 0–0.5)
INFLUENZA A, MOLECULAR: NEGATIVE
INFLUENZA B, MOLECULAR: NEGATIVE
LYMPHOCYTES # BLD AUTO: 3.2 K/UL (ref 1–4.8)
LYMPHOCYTES NFR BLD: 24.7 % (ref 18–48)
MCH RBC QN AUTO: 28.6 PG (ref 27–31)
MCHC RBC AUTO-ENTMCNC: 31.3 G/DL (ref 32–36)
MCV RBC AUTO: 92 FL (ref 82–98)
MONOCYTES # BLD AUTO: 0.8 K/UL (ref 0.3–1)
MONOCYTES NFR BLD: 6 % (ref 4–15)
NEUTROPHILS # BLD AUTO: 8.4 K/UL (ref 1.8–7.7)
NEUTROPHILS NFR BLD: 65.5 % (ref 38–73)
NRBC BLD-RTO: 0 /100 WBC
PLATELET # BLD AUTO: 305 K/UL (ref 150–450)
PMV BLD AUTO: 10.8 FL (ref 9.2–12.9)
POTASSIUM SERPL-SCNC: 5.1 MMOL/L (ref 3.5–5.1)
PROT SERPL-MCNC: 7.7 G/DL (ref 6–8.4)
RBC # BLD AUTO: 5.03 M/UL (ref 4–5.4)
SARS-COV-2 RDRP RESP QL NAA+PROBE: NEGATIVE
SODIUM SERPL-SCNC: 142 MMOL/L (ref 136–145)
SPECIMEN SOURCE: NORMAL
WBC # BLD AUTO: 12.8 K/UL (ref 3.9–12.7)

## 2023-07-24 PROCEDURE — U0002 COVID-19 LAB TEST NON-CDC: HCPCS

## 2023-07-24 PROCEDURE — 25000003 PHARM REV CODE 250

## 2023-07-24 PROCEDURE — 85025 COMPLETE CBC W/AUTO DIFF WBC: CPT

## 2023-07-24 PROCEDURE — 80053 COMPREHEN METABOLIC PANEL: CPT

## 2023-07-24 PROCEDURE — 99283 EMERGENCY DEPT VISIT LOW MDM: CPT

## 2023-07-24 PROCEDURE — 87651 STREP A DNA AMP PROBE: CPT

## 2023-07-24 PROCEDURE — 87502 INFLUENZA DNA AMP PROBE: CPT

## 2023-07-24 PROCEDURE — 86140 C-REACTIVE PROTEIN: CPT

## 2023-07-24 RX ORDER — ACETAMINOPHEN 500 MG
1000 TABLET ORAL
Status: COMPLETED | OUTPATIENT
Start: 2023-07-24 | End: 2023-07-24

## 2023-07-24 RX ORDER — NAPROXEN 500 MG/1
500 TABLET ORAL 2 TIMES DAILY WITH MEALS
Qty: 10 TABLET | Refills: 0 | Status: SHIPPED | OUTPATIENT
Start: 2023-07-24 | End: 2023-07-29

## 2023-07-24 RX ORDER — KETOROLAC TROMETHAMINE 30 MG/ML
15 INJECTION, SOLUTION INTRAMUSCULAR; INTRAVENOUS
Status: DISCONTINUED | OUTPATIENT
Start: 2023-07-24 | End: 2023-07-24

## 2023-07-24 RX ADMIN — ACETAMINOPHEN 1000 MG: 500 TABLET ORAL at 10:07

## 2023-07-25 ENCOUNTER — OFFICE VISIT (OUTPATIENT)
Dept: PODIATRY | Facility: CLINIC | Age: 48
End: 2023-07-25
Payer: MEDICAID

## 2023-07-25 VITALS — HEIGHT: 64 IN | BODY MASS INDEX: 41.83 KG/M2 | WEIGHT: 245 LBS

## 2023-07-25 DIAGNOSIS — M79.89 SWELLING OF LEFT FOOT: ICD-10-CM

## 2023-07-25 DIAGNOSIS — L03.116 CELLULITIS OF LEFT FOOT: ICD-10-CM

## 2023-07-25 DIAGNOSIS — I87.2 VENOUS INSUFFICIENCY: ICD-10-CM

## 2023-07-25 DIAGNOSIS — B35.3 TINEA PEDIS OF LEFT FOOT: Primary | ICD-10-CM

## 2023-07-25 DIAGNOSIS — R26.2 DIFFICULTY IN WALKING, NOT ELSEWHERE CLASSIFIED: ICD-10-CM

## 2023-07-25 DIAGNOSIS — M79.672 LEFT FOOT PAIN: ICD-10-CM

## 2023-07-25 PROCEDURE — 99999 PR PBB SHADOW E&M-EST. PATIENT-LVL II: ICD-10-PCS | Mod: PBBFAC,,, | Performed by: PODIATRIST

## 2023-07-25 PROCEDURE — 1160F PR REVIEW ALL MEDS BY PRESCRIBER/CLIN PHARMACIST DOCUMENTED: ICD-10-PCS | Mod: CPTII,,, | Performed by: PODIATRIST

## 2023-07-25 PROCEDURE — 3008F PR BODY MASS INDEX (BMI) DOCUMENTED: ICD-10-PCS | Mod: CPTII,,, | Performed by: PODIATRIST

## 2023-07-25 PROCEDURE — 3008F BODY MASS INDEX DOCD: CPT | Mod: CPTII,,, | Performed by: PODIATRIST

## 2023-07-25 PROCEDURE — 99999 PR PBB SHADOW E&M-EST. PATIENT-LVL II: CPT | Mod: PBBFAC,,, | Performed by: PODIATRIST

## 2023-07-25 PROCEDURE — 99214 OFFICE O/P EST MOD 30 MIN: CPT | Mod: S$PBB,,, | Performed by: PODIATRIST

## 2023-07-25 PROCEDURE — 99212 OFFICE O/P EST SF 10 MIN: CPT | Mod: PBBFAC,PN | Performed by: PODIATRIST

## 2023-07-25 PROCEDURE — 99214 PR OFFICE/OUTPT VISIT, EST, LEVL IV, 30-39 MIN: ICD-10-PCS | Mod: S$PBB,,, | Performed by: PODIATRIST

## 2023-07-25 PROCEDURE — 1159F MED LIST DOCD IN RCRD: CPT | Mod: CPTII,,, | Performed by: PODIATRIST

## 2023-07-25 PROCEDURE — 1160F RVW MEDS BY RX/DR IN RCRD: CPT | Mod: CPTII,,, | Performed by: PODIATRIST

## 2023-07-25 PROCEDURE — 1159F PR MEDICATION LIST DOCUMENTED IN MEDICAL RECORD: ICD-10-PCS | Mod: CPTII,,, | Performed by: PODIATRIST

## 2023-07-25 RX ORDER — CLINDAMYCIN HYDROCHLORIDE 300 MG/1
300 CAPSULE ORAL EVERY 8 HOURS
Qty: 30 CAPSULE | Refills: 0 | Status: SHIPPED | OUTPATIENT
Start: 2023-07-25 | End: 2023-08-04

## 2023-07-25 RX ORDER — TERBINAFINE HYDROCHLORIDE 250 MG/1
250 TABLET ORAL DAILY
Qty: 7 TABLET | Refills: 0 | Status: SHIPPED | OUTPATIENT
Start: 2023-07-25 | End: 2023-08-01

## 2023-07-25 RX ORDER — TRAMADOL HYDROCHLORIDE 50 MG/1
50 TABLET ORAL EVERY 6 HOURS PRN
Qty: 28 TABLET | Refills: 0 | Status: SHIPPED | OUTPATIENT
Start: 2023-07-25 | End: 2023-08-30

## 2023-07-25 NOTE — DISCHARGE INSTRUCTIONS
Please follow up with Dr. Villa tomorrow for further evaluation and recheck.  You are on the correct antibiotics to treat your current infection.  Please continue to follow his instructions to not weight bear on the foot.  Please return to the ED for worsening fevers not responding to medication, chest pain, shortness of breath, difficulty breathing, or any new or worsening concerns.

## 2023-07-25 NOTE — ED PROVIDER NOTES
Encounter Date: 7/24/2023       History     Chief Complaint   Patient presents with    Abscess     Pt went to pediatrist on the 18 th has been taking abx and pt stated she is having fever and it is spreading and is unable to bear weight on l foot     Patient is a 47 y.o. female with past medical history of MRSA infection of the foot who presents to ED via self for concern for left foot pain which began 2 month(s) ago.  Patient reports she is been having foot pain on and off for the past 2 months.  Patient reports she noticed a bump on the bottom of her left foot on July 15th that she popped.  Patient reports she came to the hospital it was diagnosis cellulitis and put on doxycycline for 10 days.  Patient reports she followed up with Dr. Villa Podiatry he was told her not to walk on it and wheelchair and keep it clean with warm water.  Patient reports she was put on gabapentin, terbinafine, and hydrocodone.  Patient reports the hydrocodone isn't helping with the pain and just just making her itchy.  Patient reports she came back to the hospital today because she thinks the rashes getting worse on the bottom of her foot and she was continuing to have pain.  Patient reports she has a another appointment Dr. Villa tomorrow.  Patient reports she is been running fevers on and off since developing the foot wound.  Patient reports T-max 102.0°.  Patient reports she is been checked for diabetes and she does not have it.  Patient reports she was not been officially diagnosed with hypertension but she would elevated blood pressure readings in the ED the last time she was here.  Patient denies chest pain or shortness of breath.  Patient reports she is had a recent cough and runny nose.  Patient is awake and alert in no acute distress.                Review of patient's allergies indicates:   Allergen Reactions    Iodine Swelling    Mobic [meloxicam]     Pcn [penicillins]     Sulfa (sulfonamide antibiotics)      No past  medical history on file.  Past Surgical History:   Procedure Laterality Date    HYSTERECTOMY      partical     No family history on file.     Review of Systems   Constitutional:  Positive for fever.   HENT:  Positive for sore throat. Negative for congestion, drooling, ear discharge, ear pain and trouble swallowing.    Respiratory:  Negative for shortness of breath.    Cardiovascular:  Negative for chest pain.   Gastrointestinal:  Negative for abdominal pain, nausea and vomiting.   Genitourinary: Negative.    Musculoskeletal:  Negative for back pain.   Skin:  Positive for wound. Negative for color change and pallor.   Neurological: Negative.  Negative for weakness.   Hematological:  Does not bruise/bleed easily.   Psychiatric/Behavioral: Negative.       Physical Exam     Initial Vitals [07/24/23 2000]   BP Pulse Resp Temp SpO2   (!) 184/124 96 20 98.6 °F (37 °C) 98 %      MAP       --         Physical Exam    Nursing note and vitals reviewed.  Constitutional: She appears well-developed and well-nourished. She is not diaphoretic. No distress.   HENT:   Head: Normocephalic and atraumatic.   Right Ear: External ear normal.   Left Ear: External ear normal.   Nose: Nose normal.   Mouth/Throat: Oropharynx is clear and moist. No oropharyngeal exudate.   Eyes: Conjunctivae and EOM are normal. Right eye exhibits no discharge. Left eye exhibits no discharge.   Neck:   Normal range of motion.  Cardiovascular:  Normal rate, regular rhythm, normal heart sounds and intact distal pulses.     Exam reveals no gallop and no friction rub.       No murmur heard.  Pulmonary/Chest: Breath sounds normal. No respiratory distress. She has no wheezes. She has no rhonchi. She has no rales. She exhibits no tenderness.   Musculoskeletal:      Cervical back: Normal range of motion.      Left foot: Normal range of motion and normal capillary refill. Swelling and tenderness present. No deformity, foot drop or crepitus.     Neurological: She is  alert and oriented to person, place, and time. She has normal strength. GCS score is 15. GCS eye subscore is 4. GCS verbal subscore is 5. GCS motor subscore is 6.   Skin: Skin is warm and dry. Capillary refill takes less than 2 seconds.   Psychiatric: She has a normal mood and affect. Her behavior is normal. Judgment and thought content normal.       ED Course   Procedures  Labs Reviewed   CBC W/ AUTO DIFFERENTIAL - Abnormal; Notable for the following components:       Result Value    WBC 12.80 (*)     MCHC 31.3 (*)     Gran # (ANC) 8.4 (*)     Immature Grans (Abs) 0.05 (*)     All other components within normal limits   COMPREHENSIVE METABOLIC PANEL - Abnormal; Notable for the following components:    CO2 31 (*)     Anion Gap 7 (*)     All other components within normal limits   C-REACTIVE PROTEIN - Abnormal; Notable for the following components:    CRP 1.18 (*)     All other components within normal limits   GROUP A STREP, MOLECULAR   SARS-COV-2 RNA AMPLIFICATION, QUAL   INFLUENZA A AND B ANTIGEN    Narrative:     Specimen Source->Nasopharyngeal Swab          Imaging Results    None          Medications   acetaminophen tablet 1,000 mg (1,000 mg Oral Given 7/24/23 2245)     Medical Decision Making:   Initial Assessment:   Patient is a 47 y.o. female with past medical history of MRSA infection of the foot who presents to ED via self for concern for left foot pain which began 2 month(s) ago.  Patient reports she is been having foot pain on and off for the past 2 months.  Patient reports she noticed a bump on the bottom of her left foot on July 15th that she popped.  Patient reports she came to the hospital it was diagnosis cellulitis and put on doxycycline for 10 days.  Patient reports she followed up with Dr. Villa Podiatry he was told her not to walk on it and wheelchair and keep it clean with warm water.  Patient reports she was put on gabapentin, terbinafine, and hydrocodone.  Patient reports the hydrocodone  isn't helping with the pain and just just making her itchy.  Patient reports she came back to the hospital today because she thinks the rashes getting worse on the bottom of her foot and she was continuing to have pain.  Patient reports she has a another appointment Dr. Villa tomorrow.  Patient reports she is been running fevers on and off since developing the foot wound.  Patient reports T-max 102.0°.  Patient reports she is been checked for diabetes and she does not have it.  Patient reports she was not been officially diagnosed with hypertension but she would elevated blood pressure readings in the ED the last time she was here.  Patient denies chest pain or shortness of breath.  Patient reports she is had a recent cough and runny nose.  Patient is awake and alert in no acute distress.      Differential Diagnosis:   Differential diagnosis include but not limited to cellulitis, abscess, osteomyelitis, electrolyte abnormality, worsening wound infection, sepsis, hypertension  ED Management:  MDM    Patient presents for emergent evaluation of acute foot pain that poses a possible threat to life and/or bodily function.    In the ED patient found to have acute foot pain and swelling.  Patient has some white/yellow discoloration of the bottom of the foot with a small area of erythema.  Patient has no erythema to the top of her foot.  Patient has pain to palpation of the foot with normal cap refill and sensation.  Patient has some yellow drainage noted from the bottom of her foot.  Patient has clear lung sounds bilaterally with no increased work of breathing on exam.  Patient afebrile while in the ED. patient's wound on the bottom of her left foot appears superficial and does not seem to be showing signs of osteomyelitis at this time.  I ordered labs and personally reviewed them.  Labs significant for negative COVID, influenza, strep.  CBC significant for WBC 12.80, RBC 5.03, hemoglobin 14.3, hematocrit 46.0, CMP  significant for CO2 31, anion gap 7, CRP 1.18.  When comparing labs to labs sign days ago there no significant changes.    Patient had a cardiac workup, EKG, chest x-ray, and left foot x-ray 9 days ago while in the ED.  All these results were reviewed and looked at with my attending.  Patient's wound culture from 9 days ago was MRSA positive.  Susceptibility report reveals MRSA is sensitive to tetracyclines and patient is currently on doxycycline which is on appropriate antibiotic.    Discharge MDM  I discussed the patient presentation labs with my attending Dr. Morales.   Patient was managed in the ED with oral Tylenol.    The response to treatment was good.    Patient has not appointment with Dr. Villa tomorrow, discussed with patient that she needs to follow up with Dr. Villa tomorrow and finished taking her antibiotics as prescribed.  Discussed with patient that she can stop taking the hydrocodone due to it making her itchy and that she can try taking naproxen for pain.  Discussed with patient that she needs to follow Dr. Villa's instructions about not weight-bearing on the left foot.  Patient was discharged in stable condition.  Detailed return precautions discussed to return to the ED for worsening redness and swelling of the foot, streaking up the foot or leg, leg swelling or pain, fever not responding to medication, worsening purulent drainage from the wound, chest pain, difficulty breathing, or any new or worsening concerns.  Patient states understanding.                        Clinical Impression:   Final diagnoses:  [M79.673] Foot pain  [L03.116] Cellulitis of left lower extremity  [Z51.89] Encounter for wound re-check (Primary)        ED Disposition Condition    Discharge Stable          ED Prescriptions       Medication Sig Dispense Start Date End Date Auth. Provider    naproxen (NAPROSYN) 500 MG tablet Take 1 tablet (500 mg total) by mouth 2 (two) times daily with meals. for 5 days 10 tablet  7/24/2023 7/29/2023 Margaret Langston NP          Follow-up Information       Follow up With Specialties Details Why Contact Info Additional Information    Luis Villa DPM Podiatry, Surgery, Wound Care Schedule an appointment as soon as possible for a visit  For recheck/continuing care [address]  [address]  [facility] [address]  [phone]       [facility] - Emergency Dept Emergency Medicine  If symptoms worsen [address] [facility] [address]  [phone] 1st floor             Margaret Langston NP  07/25/23 0007       Margaret Langston NP  07/25/23 0008

## 2023-07-25 NOTE — PROGRESS NOTES
"  [address] [facility] [address] [place] [address]  Phone: [phone]   [phone]    Patient's PCP:Primary Doctor No  Referring Provider: No ref. provider found    Subjective:      Chief Complaint:: Foot Problem (Still has Tinea pedis on bottom of left foot from pinky toe down to arch)    WILVER Holm is a 47 y.o. female who presents today with a follow up on Tinea pedis on bottom of left foot from 5th toe down to arch. Seeing Dermatology on the 31st.  Patient states that the Norco prescribed on previous visit caused itching.  Patient was seen again in the ER earlier this week due to pain.    Vitals:    07/25/23 1506   Weight: 111.1 kg (245 lb)   Height: 5' 4" (1.626 m)   PainSc:   9      Shoe Size: 7.5-8    Past Surgical History:   Procedure Laterality Date    HYSTERECTOMY      partical     History reviewed. No pertinent past medical history.  History reviewed. No pertinent family history.     Social History:   Marital Status: Legally   Alcohol History:  has no history on file for alcohol use.  Tobacco History:  has no history on file for tobacco use.  Drug History:  has no history on file for drug use.    Review of patient's allergies indicates:   Allergen Reactions    Iodine Swelling    Mobic [meloxicam]     Pcn [penicillins]     Sulfa (sulfonamide antibiotics)     Norco [hydrocodone-acetaminophen] Itching and Rash       Current Outpatient Medications   Medication Sig Dispense Refill    clindamycin (CLEOCIN) 300 MG capsule Take 1 capsule (300 mg total) by mouth every 8 (eight) hours. for 10 days 30 capsule 0    gabapentin (NEURONTIN) 300 MG capsule Take 1 capsule (300 mg total) by mouth every evening. 30 capsule 0    ibuprofen (ADVIL,MOTRIN) 800 MG tablet Take 1 tablet (800 mg total) by mouth 3 (three) times daily as needed. (Patient not taking: Reported on 7/17/2023) 20 tablet 0    naproxen (NAPROSYN) 500 MG tablet Take 1 tablet (500 mg total) by mouth 2 (two) times daily with meals. " for 5 days 10 tablet 0    terbinafine HCL (LAMISIL) 250 mg tablet Take 1 tablet (250 mg total) by mouth once daily. for 7 days 7 tablet 0    traMADoL (ULTRAM) 50 mg tablet Take 1 tablet (50 mg total) by mouth every 6 (six) hours as needed for Pain. 28 tablet 0     No current facility-administered medications for this visit.       Review of Systems   Constitutional:  Negative for chills, fatigue, fever and unexpected weight change.   HENT:  Negative for hearing loss and trouble swallowing.    Eyes:  Negative for photophobia and visual disturbance.   Respiratory:  Negative for cough, shortness of breath and wheezing.    Cardiovascular:  Negative for chest pain, palpitations and leg swelling.   Gastrointestinal:  Negative for abdominal pain and nausea.   Genitourinary:  Negative for dysuria and frequency.   Musculoskeletal:  Positive for gait problem. Negative for arthralgias, back pain, joint swelling and myalgias.   Skin:  Positive for color change. Negative for rash and wound.   Neurological:  Negative for tremors, seizures, speech difficulty, weakness and headaches.   Hematological:  Does not bruise/bleed easily.       Objective:        Physical Exam:   Foot Exam    General  General Appearance: appears stated age and healthy   Orientation: alert and oriented to person, place, and time   Affect: appropriate   Gait: antalgic       Left Foot/Ankle      Inspection and Palpation  Ecchymosis: none  Tenderness: (Plantar foot)  Swelling: (Mild lower extremity edema)  Arch: normal  Hammertoes: absent  Claw toes: absent  Hallux valgus: no  Hallux limitus: no  Skin Exam: maceration, tinea and skin changes; no drainage, no ulcer and no erythema   Neurovascular  Dorsalis pedis: 2+  Posterior tibial: 2+  Capillary refill: 2+  Varicose veins: present  Saphenous nerve sensation: normal  Tibial nerve sensation: normal  Superficial peroneal nerve sensation: normal  Deep peroneal nerve sensation: normal  Sural nerve sensation:  normal    Muscle Strength  Ankle dorsiflexion: 5  Ankle plantar flexion: 5  Ankle inversion: 5  Ankle eversion: 5  Great toe extension: 5  Great toe flexion: 5    Range of Motion    Normal left ankle ROM    Tests  Anterior drawer: negative   Talar tilt: negative   PT Tinel's sign: negative  Paresthesia: negative  Comments  There is a central ruptured blister extending to dermis without signs of infection.  There are several scattered vesicles as well as some redness and peeling along the plantar foot  Physical Exam  Cardiovascular:      Pulses:           Dorsalis pedis pulses are 2+ on the left side.        Posterior tibial pulses are 2+ on the left side.   Musculoskeletal:      Left foot: No bunion.   Feet:      Left foot:      Skin integrity: Skin breakdown present. No ulcer or erythema.             Left Ankle/Foot Exam     Range of Motion   The patient has normal left ankle ROM.     Comments:  There is a central ruptured blister extending to dermis without signs of infection.  There are several scattered vesicles as well as some redness and peeling along the plantar foot      Muscle Strength   Left Lower Extremity   Ankle Dorsiflexion:  5   Plantar flexion:  5/5     Vascular Exam       Left Pulses  Dorsalis Pedis:      2+  Posterior Tibial:      2+         Imaging: X-Ray Foot Complete Left  HISTORY: Left foot pain.    FINDINGS: 3 views of the left foot show no acute fracture, dislocation or destructive osseous lesion. The joint spaces are preserved. Bone mineralization is normal, with no soft tissue abnormalities or radiopaque foreign bodies seen.    IMPRESSION: Negative left foot radiographs.    Electronically signed by:  Liang Horan MD  7/15/2023 10:42 AM CDT Workstation: [id number]  X-Ray Chest AP Portable  HISTORY: Hypertension    FINDINGS: Portable chest radiograph at 1015 hours with no prior studies for comparison shows the cardiomediastinal silhouette and pulmonary vasculature are within normal  limits.    The lungs are normally expanded, with no consolidation, large pleural effusion, or evidence of pulmonary edema. No confluent infiltrates or pneumothorax. There are no significant osseous abnormalities.    IMPRESSION: No evidence of active cardiopulmonary disease.    Electronically signed by:  Liang Horan MD  7/15/2023 10:41 AM CDT Workstation: [id number]              There is improvement in maceration and sloughing of the plantar foot.  No erythema or drainage is present.  No significant edema.           Assessment:       1. Tinea pedis of left foot    2. Left foot pain    3. Cellulitis of left foot    4. Swelling of left foot    5. Difficulty in walking, not elsewhere classified    6. Venous insufficiency      Plan:   Tinea pedis of left foot  -     clindamycin (CLEOCIN) 300 MG capsule; Take 1 capsule (300 mg total) by mouth every 8 (eight) hours. for 10 days  Dispense: 30 capsule; Refill: 0  -     terbinafine HCL (LAMISIL) 250 mg tablet; Take 1 tablet (250 mg total) by mouth once daily. for 7 days  Dispense: 7 tablet; Refill: 0    Left foot pain  -     traMADoL (ULTRAM) 50 mg tablet; Take 1 tablet (50 mg total) by mouth every 6 (six) hours as needed for Pain.  Dispense: 28 tablet; Refill: 0    Cellulitis of left foot  -     clindamycin (CLEOCIN) 300 MG capsule; Take 1 capsule (300 mg total) by mouth every 8 (eight) hours. for 10 days  Dispense: 30 capsule; Refill: 0  -     terbinafine HCL (LAMISIL) 250 mg tablet; Take 1 tablet (250 mg total) by mouth once daily. for 7 days  Dispense: 7 tablet; Refill: 0    Swelling of left foot    Difficulty in walking, not elsewhere classified    Venous insufficiency      Follow up if symptoms worsen or fail to improve.    Procedures        I discussed with the patient that her foot actually appears well improved from her previous exam.  Given the improvement on the previous course of medication I am going to give her 1 additional week of the oral Lamisil.  I am also  going to refill her antibiotics given the MRSA culture from the ED. patient has not been using the gentian violet and I do recommend that she begin utilizing this.  Recommend that she continue this treatment regimen until seeing Dermatology next week and then she continue further treatment per their recommendations.    Counseling:     I provided patient education verbally regarding:   Patient diagnosis, treatment options, as well as alternatives, risks, and benefits.     This note was created using Dragon voice recognition software that occasionally misinterpreted phrases or words.

## 2023-08-07 ENCOUNTER — HOSPITAL ENCOUNTER (EMERGENCY)
Facility: HOSPITAL | Age: 48
Discharge: HOME | End: 2023-08-07
Attending: EMERGENCY MEDICINE
Payer: COMMERCIAL

## 2023-08-07 VITALS
WEIGHT: 240 LBS | HEIGHT: 65 IN | OXYGEN SATURATION: 100 % | SYSTOLIC BLOOD PRESSURE: 165 MMHG | RESPIRATION RATE: 19 BRPM | BODY MASS INDEX: 39.99 KG/M2 | DIASTOLIC BLOOD PRESSURE: 80 MMHG | HEART RATE: 90 BPM | TEMPERATURE: 98 F

## 2023-08-07 DIAGNOSIS — G24.9 DYSTONIA: ICD-10-CM

## 2023-08-07 DIAGNOSIS — R45.89 DYSPHORIC MOOD: ICD-10-CM

## 2023-08-07 DIAGNOSIS — G44.209 ACUTE NON INTRACTABLE TENSION-TYPE HEADACHE: Primary | ICD-10-CM

## 2023-08-07 DIAGNOSIS — I10 HYPERTENSION: ICD-10-CM

## 2023-08-07 LAB
ALBUMIN SERPL BCP-MCNC: 3.8 G/DL (ref 3.5–5.2)
ALP SERPL-CCNC: 86 U/L (ref 55–135)
ALT SERPL W/O P-5'-P-CCNC: 26 U/L (ref 10–44)
ANION GAP SERPL CALC-SCNC: 6 MMOL/L (ref 8–16)
AST SERPL-CCNC: 20 U/L (ref 10–40)
BASOPHILS # BLD AUTO: 0.08 K/UL (ref 0–0.2)
BASOPHILS NFR BLD: 0.6 % (ref 0–1.9)
BILIRUB SERPL-MCNC: 0.4 MG/DL (ref 0.1–1)
BILIRUB UR QL STRIP: NEGATIVE
BUN SERPL-MCNC: 16 MG/DL (ref 6–20)
CALCIUM SERPL-MCNC: 9.2 MG/DL (ref 8.7–10.5)
CHLORIDE SERPL-SCNC: 104 MMOL/L (ref 95–110)
CLARITY UR: CLEAR
CO2 SERPL-SCNC: 27 MMOL/L (ref 23–29)
COLOR UR: YELLOW
CREAT SERPL-MCNC: 0.7 MG/DL (ref 0.5–1.4)
DIFFERENTIAL METHOD: ABNORMAL
EOSINOPHIL # BLD AUTO: 0.5 K/UL (ref 0–0.5)
EOSINOPHIL NFR BLD: 3.7 % (ref 0–8)
ERYTHROCYTE [DISTWIDTH] IN BLOOD BY AUTOMATED COUNT: 13.2 % (ref 11.5–14.5)
EST. GFR  (NO RACE VARIABLE): >60 ML/MIN/1.73 M^2
GLUCOSE SERPL-MCNC: 94 MG/DL (ref 70–110)
GLUCOSE UR QL STRIP: NEGATIVE
HCT VFR BLD AUTO: 39.5 % (ref 37–48.5)
HGB BLD-MCNC: 12.6 G/DL (ref 12–16)
HGB UR QL STRIP: NEGATIVE
IMM GRANULOCYTES # BLD AUTO: 0.08 K/UL (ref 0–0.04)
IMM GRANULOCYTES NFR BLD AUTO: 0.6 % (ref 0–0.5)
KETONES UR QL STRIP: NEGATIVE
LEUKOCYTE ESTERASE UR QL STRIP: NEGATIVE
LYMPHOCYTES # BLD AUTO: 2.7 K/UL (ref 1–4.8)
LYMPHOCYTES NFR BLD: 21.9 % (ref 18–48)
MAGNESIUM SERPL-MCNC: 1.8 MG/DL (ref 1.6–2.6)
MCH RBC QN AUTO: 28.6 PG (ref 27–31)
MCHC RBC AUTO-ENTMCNC: 31.9 G/DL (ref 32–36)
MCV RBC AUTO: 90 FL (ref 82–98)
MONOCYTES # BLD AUTO: 0.8 K/UL (ref 0.3–1)
MONOCYTES NFR BLD: 6.4 % (ref 4–15)
NEUTROPHILS # BLD AUTO: 8.3 K/UL (ref 1.8–7.7)
NEUTROPHILS NFR BLD: 66.8 % (ref 38–73)
NITRITE UR QL STRIP: NEGATIVE
NRBC BLD-RTO: 0 /100 WBC
PH UR STRIP: 6 [PH] (ref 5–8)
PLATELET # BLD AUTO: 254 K/UL (ref 150–450)
PMV BLD AUTO: 11.3 FL (ref 9.2–12.9)
POTASSIUM SERPL-SCNC: 4.1 MMOL/L (ref 3.5–5.1)
PROT SERPL-MCNC: 7.5 G/DL (ref 6–8.4)
PROT UR QL STRIP: NEGATIVE
RBC # BLD AUTO: 4.41 M/UL (ref 4–5.4)
SODIUM SERPL-SCNC: 137 MMOL/L (ref 136–145)
SP GR UR STRIP: 1.01 (ref 1–1.03)
URN SPEC COLLECT METH UR: NORMAL
UROBILINOGEN UR STRIP-ACNC: NEGATIVE EU/DL
WBC # BLD AUTO: 12.42 K/UL (ref 3.9–12.7)

## 2023-08-07 PROCEDURE — 93005 ELECTROCARDIOGRAM TRACING: CPT | Performed by: INTERNAL MEDICINE

## 2023-08-07 PROCEDURE — 63600175 PHARM REV CODE 636 W HCPCS

## 2023-08-07 PROCEDURE — 96375 TX/PRO/DX INJ NEW DRUG ADDON: CPT

## 2023-08-07 PROCEDURE — 83735 ASSAY OF MAGNESIUM: CPT

## 2023-08-07 PROCEDURE — 99285 EMERGENCY DEPT VISIT HI MDM: CPT | Mod: 25

## 2023-08-07 PROCEDURE — 63600175 PHARM REV CODE 636 W HCPCS: Performed by: STUDENT IN AN ORGANIZED HEALTH CARE EDUCATION/TRAINING PROGRAM

## 2023-08-07 PROCEDURE — 93010 EKG 12-LEAD: ICD-10-PCS | Mod: ,,, | Performed by: INTERNAL MEDICINE

## 2023-08-07 PROCEDURE — 80053 COMPREHEN METABOLIC PANEL: CPT

## 2023-08-07 PROCEDURE — 81003 URINALYSIS AUTO W/O SCOPE: CPT

## 2023-08-07 PROCEDURE — 96374 THER/PROPH/DIAG INJ IV PUSH: CPT

## 2023-08-07 PROCEDURE — 85025 COMPLETE CBC W/AUTO DIFF WBC: CPT

## 2023-08-07 PROCEDURE — 93010 ELECTROCARDIOGRAM REPORT: CPT | Mod: ,,, | Performed by: INTERNAL MEDICINE

## 2023-08-07 RX ORDER — DIPHENHYDRAMINE HYDROCHLORIDE 50 MG/ML
50 INJECTION INTRAMUSCULAR; INTRAVENOUS
Status: COMPLETED | OUTPATIENT
Start: 2023-08-07 | End: 2023-08-07

## 2023-08-07 RX ORDER — PROCHLORPERAZINE EDISYLATE 5 MG/ML
10 INJECTION INTRAMUSCULAR; INTRAVENOUS
Status: COMPLETED | OUTPATIENT
Start: 2023-08-07 | End: 2023-08-07

## 2023-08-07 RX ORDER — HYDRALAZINE HYDROCHLORIDE 20 MG/ML
10 INJECTION INTRAMUSCULAR; INTRAVENOUS
Status: COMPLETED | OUTPATIENT
Start: 2023-08-07 | End: 2023-08-07

## 2023-08-07 RX ORDER — KETOROLAC TROMETHAMINE 30 MG/ML
15 INJECTION, SOLUTION INTRAMUSCULAR; INTRAVENOUS
Status: COMPLETED | OUTPATIENT
Start: 2023-08-07 | End: 2023-08-07

## 2023-08-07 RX ADMIN — PROCHLORPERAZINE EDISYLATE 10 MG: 5 INJECTION INTRAMUSCULAR; INTRAVENOUS at 08:08

## 2023-08-07 RX ADMIN — HYDRALAZINE HYDROCHLORIDE 10 MG: 20 INJECTION INTRAMUSCULAR; INTRAVENOUS at 08:08

## 2023-08-07 RX ADMIN — DIPHENHYDRAMINE HYDROCHLORIDE 50 MG: 50 INJECTION, SOLUTION INTRAMUSCULAR; INTRAVENOUS at 08:08

## 2023-08-07 RX ADMIN — KETOROLAC TROMETHAMINE 15 MG: 30 INJECTION, SOLUTION INTRAMUSCULAR; INTRAVENOUS at 10:08

## 2023-08-08 NOTE — ED PROVIDER NOTES
Encounter Date: 8/7/2023       History     Chief Complaint   Patient presents with    behavioral changes     Pt arrived with family, states behavior changes for one week, out burst of repeating words, with twitching and complaints of head and neck pain, pt states this started after different medication changes within the last month with foot infection treatment. Pt appears anxious.     HPI    Eliezer Holm is a 47-year-old female presents to the ED with a chief concern of abnormal movements that she can not control.  She endorses these changes for about 1 week.  She does not know if they appeared when she was getting treated for a foot infection or if she is experiencing somatic symptoms secondary to finding her mother did on the floor.  Denies any fevers or chills, the patient endorsing headache and myalgias secondary to constant twitching as well as TMJ pain.  She is noticed herself grinding her teeth often.  Denies any recreational drug use or psychiatric history.  She reports 1 episode of shaking while she was on the toilet.  Per her boyfriend, her eyes rolled to the back of her head.  They report a postictal recovery period.  They called EMS, but decided not to go into the hospital for evaluation as had subsided    Review of patient's allergies indicates:   Allergen Reactions    Iodine Swelling    Mobic [meloxicam]     Pcn [penicillins]     Sulfa (sulfonamide antibiotics)     Norco [hydrocodone-acetaminophen] Itching and Rash     No past medical history on file.  Past Surgical History:   Procedure Laterality Date    HYSTERECTOMY      partical     No family history on file.     Review of Systems   Constitutional:  Negative for chills and fever.   Respiratory:  Negative for chest tightness and shortness of breath.    Cardiovascular:  Negative for chest pain.   Gastrointestinal:  Negative for abdominal pain.   Musculoskeletal:  Positive for myalgias and neck pain. Negative for back pain and neck stiffness.    Neurological:  Positive for syncope and headaches. Negative for dizziness.   Psychiatric/Behavioral:  Positive for dysphoric mood. The patient is nervous/anxious and is hyperactive.        Physical Exam     Initial Vitals [08/07/23 1901]   BP Pulse Resp Temp SpO2   (!) 220/125 99 18 98 °F (36.7 °C) 98 %      MAP       --         Physical Exam    ED Course   Procedures  Labs Reviewed   CBC W/ AUTO DIFFERENTIAL - Abnormal; Notable for the following components:       Result Value    MCHC 31.9 (*)     Immature Granulocytes 0.6 (*)     Gran # (ANC) 8.3 (*)     Immature Grans (Abs) 0.08 (*)     All other components within normal limits   COMPREHENSIVE METABOLIC PANEL - Abnormal; Notable for the following components:    Anion Gap 6 (*)     All other components within normal limits   URINALYSIS, REFLEX TO URINE CULTURE    Narrative:     Specimen Source->Urine   MAGNESIUM          Imaging Results              CT Head Without Contrast (Final result)  Result time 08/07/23 20:36:41      Final result by Fany Cavazos III, MD (08/07/23 20:36:41)                   Narrative:    EXAM:  CT Head Without Intravenous Contrast    CLINICAL HISTORY:  The patient is 47 years old and is Female; Headache, new or worsening, neuro deficit (Age 19-49y)    TECHNIQUE:  Axial computed tomography images of the head/brain without intravenous contrast.  Sagittal and coronal reformatted images were created and reviewed.  This CT exam was performed using one or more of the following dose reduction techniques:  automated exposure control, adjustment of the mA and/or kV according to patient size, and/or use of iterative reconstruction technique.    COMPARISON:  No relevant prior studies available.    FINDINGS:  BRAIN:  No evidence of mass effect, hemorrhage, or acute infarction of any major vascular territory.  VENTRICLES:  Unremarkable.  Normal for age.  BONES/JOINTS:  Unremarkable.  SOFT TISSUES:  Unremarkable.  SINUSES:  No significant  findings.  MASTOID AIR CELLS:  Clear.    IMPRESSION:  Unremarkable exam.  If symptoms remain clinically concerning, consider MRI    Electronically signed by:  Fany Cavazos MD  8/7/2023 8:36 PM CDT Workstation: [id number]                                     Medications   hydrALAZINE injection 10 mg (10 mg Intravenous Given 8/7/23 2009)   prochlorperazine injection Soln 10 mg (10 mg Intravenous Given 8/7/23 2020)   diphenhydrAMINE injection 50 mg (50 mg Intravenous Given 8/7/23 2020)   ketorolac injection 15 mg (15 mg Intravenous Given 8/7/23 2207)     Medical Decision Making:   Initial Assessment:   47-year-old female presents to the ED with a chief concern of abnormal movements.  On exam, vitals are stable, however the patient continually rolled her neck and exhibits grinding of her teeth.  Differential Diagnosis:   Dystonia, seizures, anxiety, dysphoric mood, psychosomatic symptoms, conversion disorder, CVA, medication side effect  Clinical Tests:   Lab Tests: Ordered and Reviewed  The following lab test(s) were unremarkable: CBC and CMP       <> Summary of Lab: Unremarkable  Radiological Study: Ordered and Reviewed  ED Management:  CT head was negative.  Patient was treated for a possible dystonic reaction and given 50 mg of Benadryl IV, 10 mg of Compazine, and 15 mg of Toradol.  On reassessment, the patient's movements have stopped and she stated that she felt better.  They discussed possibility of this being a combination of somatic and psychiatric symptoms.  She was given a referral to Neurology for possible new onset seizure as well as Psychiatry for possible psychosomatic symptoms.  I discussed following up with her PCP as soon as possible with this patient as well as return precautions.  She is stable for discharge at this time.    Melissa Vick DO  [facility] PGY-3    Attending Note:  I provided a face to face evaluation of this patient.  I discussed the patient's care with the Resident.  I  reviewed their note and agree with the history, physical, assessment, diagnosis, treatment, all procedures performed, xray and EKG interpretations and discharge plan provided by the Resident. My overall impression is acute non intractable tension type headache, hypertension, dystonia, dysphoric mood.  Patient did not have improvement after Compazine and Toradol but after magnesium given IV she would great improvement of her headache.  She will be discharged home I have discussed with the that she can take 400 mg of magnesium daily to help with her headaches and follow up with her neurologist  The patient has been instructed to follow up with their physician or the one provided as well as specific return precautions.   Beena Flores M.D. 8/8/2023 1:47 AM                 ED Course as of 08/08/23 0147   Mon Aug 07, 2023   2042 CT Head Without Contrast  Unremarkable exam.  If symptoms remain clinically concerning, consider MRI [KB]      ED Course User Index  [KB] Melissa Vick DO                 Clinical Impression:   Final diagnoses:  [I10] Hypertension  [G44.209] Acute non intractable tension-type headache (Primary)  [G24.9] Dystonia  [R45.89] Dysphoric mood        ED Disposition Condition    Discharge Stable          ED Prescriptions    None       Follow-up Information       Follow up With Specialties Details Why Contact Info Additional Information    [facility] - Emergency Dept Emergency Medicine Go to  As needed, If symptoms worsen. [address] [address]  [phone] 1st floor    Ramirez Castillo DO Psychiatry Schedule an appointment as soon as possible for a visit in 1 month For outpatient psychiatry services [address]  [address]  [place] [place] [address]  [phone]       Shannan Alvares, NP Neurology Schedule an appointment as soon as possible for a visit in 1 month For evaluation of first time seizure. [address]  [address]  [phone]       Please follow up with a primary care physician                  Melissa Vick DO Resident  08/08/23 0140       Beena Flores MD  08/08/23 0147

## 2023-08-08 NOTE — FIRST PROVIDER EVALUATION
"Medical screening examination initiated.  I have conducted a focused provider triage encounter, findings are as follows:    Brief history of present illness:  Behavior changes and outburst that resemble Tourette syndrome and headache present for 1 week.  Patient states she was being treated for foot infection with multiple medications and it was not agreed with her.  Her son reports that she had seizure-like activity 1 week ago.    Vitals:    08/07/23 1901   BP: (!) 220/125   Pulse: 99   Resp: 18   Temp: 98 °F (36.7 °C)   TempSrc: Oral   SpO2: 98%   Weight: 108.9 kg (240 lb)   Height: 5' 5" (1.651 m)       Pertinent physical exam:  Hypertensive.     Brief workup plan:  Labs and imaging.  CT, EKG, chest x-ray    Preliminary workup initiated; this workup will be continued and followed by the physician or advanced practice provider that is assigned to the patient when roomed.  "

## 2023-08-09 ENCOUNTER — PATIENT MESSAGE (OUTPATIENT)
Dept: PSYCHIATRY | Facility: CLINIC | Age: 48
End: 2023-08-09
Payer: COMMERCIAL

## 2023-08-25 ENCOUNTER — TELEPHONE (OUTPATIENT)
Dept: PODIATRY | Facility: CLINIC | Age: 48
End: 2023-08-25
Payer: COMMERCIAL

## 2023-08-25 NOTE — TELEPHONE ENCOUNTER
Spoke with pt. Says she hasnt gotten wheelchair. Called [employer] and spoke with Ruthie who informed me it was an insurance issue. They have attempted to reach out to the pt several time and pt voicemail is full. Called pt back gave her [facility]'s number and instructed her call them back to clear up insurance issue.

## 2023-08-25 NOTE — TELEPHONE ENCOUNTER
Ruthie with [facility] called back to clarify length of need for wheelchair. This office has 3 months length of need ,however pt condition and need has changed since last visit. We will see pt on 8/30/23. Informed [facility] issue of needing wheelchair would probably be best handled through pts primary because other significant health issues are leading to use of wheelchair, foot problems not being one of them.

## 2023-08-30 ENCOUNTER — OFFICE VISIT (OUTPATIENT)
Dept: PODIATRY | Facility: CLINIC | Age: 48
End: 2023-08-30
Payer: COMMERCIAL

## 2023-08-30 ENCOUNTER — TELEPHONE (OUTPATIENT)
Dept: PODIATRY | Facility: CLINIC | Age: 48
End: 2023-08-30

## 2023-08-30 VITALS — RESPIRATION RATE: 16 BRPM | WEIGHT: 240.06 LBS | HEIGHT: 65 IN | BODY MASS INDEX: 40 KG/M2

## 2023-08-30 DIAGNOSIS — L85.1 ACQUIRED KERATODERMA: ICD-10-CM

## 2023-08-30 DIAGNOSIS — L30.9 DERMATITIS: ICD-10-CM

## 2023-08-30 DIAGNOSIS — R26.2 DIFFICULTY IN WALKING, NOT ELSEWHERE CLASSIFIED: Primary | ICD-10-CM

## 2023-08-30 DIAGNOSIS — M79.672 LEFT FOOT PAIN: Primary | ICD-10-CM

## 2023-08-30 DIAGNOSIS — M79.672 LEFT FOOT PAIN: ICD-10-CM

## 2023-08-30 PROCEDURE — 3008F PR BODY MASS INDEX (BMI) DOCUMENTED: ICD-10-PCS | Mod: CPTII,S$GLB,, | Performed by: PODIATRIST

## 2023-08-30 PROCEDURE — 3008F BODY MASS INDEX DOCD: CPT | Mod: CPTII,S$GLB,, | Performed by: PODIATRIST

## 2023-08-30 PROCEDURE — 1160F PR REVIEW ALL MEDS BY PRESCRIBER/CLIN PHARMACIST DOCUMENTED: ICD-10-PCS | Mod: CPTII,S$GLB,, | Performed by: PODIATRIST

## 2023-08-30 PROCEDURE — 99999 PR PBB SHADOW E&M-EST. PATIENT-LVL III: ICD-10-PCS | Mod: PBBFAC,,, | Performed by: PODIATRIST

## 2023-08-30 PROCEDURE — 99213 PR OFFICE/OUTPT VISIT, EST, LEVL III, 20-29 MIN: ICD-10-PCS | Mod: S$PBB,,, | Performed by: PODIATRIST

## 2023-08-30 PROCEDURE — 99213 OFFICE O/P EST LOW 20 MIN: CPT | Mod: PBBFAC,PN | Performed by: PODIATRIST

## 2023-08-30 PROCEDURE — 99213 OFFICE O/P EST LOW 20 MIN: CPT | Mod: S$PBB,,, | Performed by: PODIATRIST

## 2023-08-30 PROCEDURE — 1159F MED LIST DOCD IN RCRD: CPT | Mod: CPTII,S$GLB,, | Performed by: PODIATRIST

## 2023-08-30 PROCEDURE — 1159F PR MEDICATION LIST DOCUMENTED IN MEDICAL RECORD: ICD-10-PCS | Mod: CPTII,S$GLB,, | Performed by: PODIATRIST

## 2023-08-30 PROCEDURE — 99999 PR PBB SHADOW E&M-EST. PATIENT-LVL III: CPT | Mod: PBBFAC,,, | Performed by: PODIATRIST

## 2023-08-30 PROCEDURE — 1160F RVW MEDS BY RX/DR IN RCRD: CPT | Mod: CPTII,S$GLB,, | Performed by: PODIATRIST

## 2023-08-30 RX ORDER — ACETAMINOPHEN AND CODEINE PHOSPHATE 300; 30 MG/1; MG/1
1 TABLET ORAL EVERY 6 HOURS PRN
Qty: 28 TABLET | Refills: 0 | Status: SHIPPED | OUTPATIENT
Start: 2023-08-30 | End: 2023-08-30

## 2023-08-30 RX ORDER — GUAIFENESIN 1200 MG
TABLET, EXTENDED RELEASE 12 HR ORAL
COMMUNITY
Start: 2023-08-01

## 2023-08-30 RX ORDER — AMLODIPINE BESYLATE 10 MG/1
10 TABLET ORAL DAILY
COMMUNITY
Start: 2023-08-16

## 2023-08-30 RX ORDER — ACETAMINOPHEN AND CODEINE PHOSPHATE 300; 30 MG/1; MG/1
1 TABLET ORAL EVERY 6 HOURS PRN
Qty: 28 TABLET | Refills: 0 | Status: SHIPPED | OUTPATIENT
Start: 2023-08-30 | End: 2023-09-09

## 2023-08-30 RX ORDER — DIPHENHYDRAMINE HCL 25 MG
CAPSULE ORAL
COMMUNITY
Start: 2023-07-31 | End: 2023-10-09

## 2023-08-30 NOTE — PROGRESS NOTES
"  [address]  [place] [address]  Phone: [phone]   [phone]    Patient's PCP:Naif Castro MD  Referring Provider: No ref. provider found    Subjective:      Chief Complaint:: Follow-up (Still has Tinea pedis on bottom of left foot from pinky toe down to arch/)    HPI  Eliezer Holm is a 47 y.o. female who presents today for follow-up of left foot skin issues and pain.  Patient states he did see Dermatology and was diagnosed with dermatitis/eczema.  She is been applying a topical steroid cream.  She states she is still having severe pain in the foot and is unable to weightbear.  She presents in wheelchair today.    Vitals:    08/30/23 1048   Resp: 16   Weight: 108.9 kg (240 lb 1.3 oz)   Height: 5' 5" (1.651 m)   PainSc:   9   PainLoc: Foot      Shoe Size:     Past Surgical History:   Procedure Laterality Date    HYSTERECTOMY      partical     History reviewed. No pertinent past medical history.  History reviewed. No pertinent family history.     Social History:   Marital Status: Legally   Alcohol History:  has no history on file for alcohol use.  Tobacco History:  has no history on file for tobacco use.  Drug History:  has no history on file for drug use.    Review of patient's allergies indicates:   Allergen Reactions    Iodine Swelling    Mobic [meloxicam]     Pcn [penicillins]     Sulfa (sulfonamide antibiotics)     Norco [hydrocodone-acetaminophen] Itching and Rash       Current Outpatient Medications   Medication Sig Dispense Refill    acetaminophen (TYLENOL) 325 mg Cap       diphenhydrAMINE (BENADRYL) 25 mg capsule       acetaminophen-codeine 300-30mg (TYLENOL #3) 300-30 mg Tab Take 1 tablet by mouth every 6 (six) hours as needed (pain). 28 tablet 0    amLODIPine (NORVASC) 5 MG tablet Take 5 mg by mouth.       No current facility-administered medications for this visit.       Review of Systems   Constitutional:  Negative for chills, fatigue, fever and unexpected " weight change.   HENT:  Negative for hearing loss and trouble swallowing.    Eyes:  Negative for photophobia and visual disturbance.   Respiratory:  Negative for cough, shortness of breath and wheezing.    Cardiovascular:  Negative for chest pain, palpitations and leg swelling.   Gastrointestinal:  Negative for abdominal pain and nausea.   Genitourinary:  Negative for dysuria and frequency.   Musculoskeletal:  Positive for gait problem. Negative for arthralgias, back pain, joint swelling and myalgias.   Skin:  Positive for color change. Negative for rash and wound.   Neurological:  Negative for tremors, seizures, speech difficulty, weakness and headaches.   Hematological:  Does not bruise/bleed easily.         Objective:        Physical Exam:   Foot Exam    General  Orientation: alert and oriented to person, place, and time   Affect: appropriate   Gait: antalgic   Assistance: wheelchair use       Left Foot/Ankle      Inspection and Palpation  Ecchymosis: none  Tenderness: (Plantar foot - 5th toe/mpj)  Swelling: (Mild lower extremity edema)  Arch: normal  Hammertoes: absent  Claw toes: absent  Hallux valgus: no  Hallux limitus: no  Skin Exam: callus, dry skin and skin changes; no drainage, no ulcer and no erythema   Neurovascular  Dorsalis pedis: 2+  Posterior tibial: 2+  Capillary refill: 2+  Varicose veins: present  Saphenous nerve sensation: normal  Tibial nerve sensation: normal  Superficial peroneal nerve sensation: normal  Deep peroneal nerve sensation: normal  Sural nerve sensation: normal    Muscle Strength  Ankle dorsiflexion: 5  Ankle plantar flexion: 5  Ankle inversion: 5  Ankle eversion: 5  Great toe extension: 5  Great toe flexion: 5    Range of Motion    Normal left ankle ROM    Tests  Anterior drawer: negative   Talar tilt: negative   PT Tinel's sign: negative  Paresthesia: negative  Comments  Skin is significantly improved from previous exam    Physical Exam  Cardiovascular:      Pulses:            Dorsalis pedis pulses are 2+ on the left side.        Posterior tibial pulses are 2+ on the left side.   Musculoskeletal:      Left foot: No bunion.   Feet:      Left foot:      Skin integrity: Callus and dry skin present. No ulcer or erythema.               Left Ankle/Foot Exam     Range of Motion   The patient has normal left ankle ROM.     Comments:  Skin is significantly improved from previous exam      Muscle Strength   Left Lower Extremity   Ankle Dorsiflexion:  5   Plantar flexion:  5/5     Vascular Exam       Left Pulses  Dorsalis Pedis:      2+  Posterior Tibial:      2+           Imaging: none                      Assessment:       1. Difficulty in walking, not elsewhere classified    2. Left foot pain    3. Acquired keratoderma    4. Dermatitis      Plan:   Difficulty in walking, not elsewhere classified    Left foot pain  -     MRI Foot (Forefoot) Left Without Contrast; Future; Expected date: 08/30/2023  -     acetaminophen-codeine 300-30mg (TYLENOL #3) 300-30 mg Tab; Take 1 tablet by mouth every 6 (six) hours as needed (pain).  Dispense: 28 tablet; Refill: 0    Acquired keratoderma    Dermatitis      Follow up if symptoms worsen or fail to improve.    Procedures        I discussed with the patient that clinically her foot appears significantly improved.  I am unsure why she is continuing to have such a large degree of pain.  I did discuss that the callus and dry skin she has could be contributing to her pain.  I recommend ammonium lactate and/or urea cream for this.    Given her significant pain which seems somewhat out of the ordinary given her physical exam findings I am going to order an MRI for further evaluation.    Counseling:     I provided patient education verbally regarding:   Patient diagnosis, treatment options, as well as alternatives, risks, and benefits.     This note was created using Dragon voice recognition software that occasionally misinterpreted phrases or words.

## 2023-08-30 NOTE — TELEPHONE ENCOUNTER
----- Message from [place] Hankins sent at 8/30/2023  3:20 PM CDT -----  Regarding: Pt call  The [facility] sent [facility] to is out of that medication. Can we resend to the other [facility] in [place]? Please call her back to let her know. Her number is [phone].    Thank you,  [place]

## 2023-08-30 NOTE — TELEPHONE ENCOUNTER
----- Message from Heather Colbert sent at 8/30/2023  3:56 PM CDT -----  Regarding: Letter of Medical Necessity  Ms. Marya cook/ [employer], called and is inquiring about the letter of medical necessity that needs to be signed and faxed back to her,  for a wheel chair for the above named patient.     Fax# [phone]    Thank you,   Heather

## 2023-08-31 ENCOUNTER — TELEPHONE (OUTPATIENT)
Dept: PODIATRY | Facility: CLINIC | Age: 48
End: 2023-08-31
Payer: COMMERCIAL

## 2023-08-31 NOTE — TELEPHONE ENCOUNTER
Pt is no longer looking for lifetime from us she wants to take the 3 month rental which requires our providers signature. She sees another specialist shortly and is going to request lifetime from them but would still like us to complete this paperwork.

## 2023-08-31 NOTE — TELEPHONE ENCOUNTER
Prescription sent. It was my understanding that the letter actually needs to come from her primary doctor.

## 2023-08-31 NOTE — TELEPHONE ENCOUNTER
Spoke with pt and told her the provider will sign document Tuesday upon his return. [facility] Called at 4:05.   Strong peripheral pulses

## 2023-09-11 DIAGNOSIS — R56.9 SEIZURE-LIKE ACTIVITY: Primary | ICD-10-CM

## 2023-09-29 ENCOUNTER — TELEPHONE (OUTPATIENT)
Dept: PODIATRY | Facility: CLINIC | Age: 48
End: 2023-09-29
Payer: COMMERCIAL

## 2023-09-29 NOTE — TELEPHONE ENCOUNTER
----- Message from [place] Hankins sent at 9/29/2023  2:49 PM CDT -----  Regarding: pt call  Pt is requesting pain medication. She said tylenol 3 worked for her. She will come in for her MRI results on 10/9/23.    Thank you,  [place]

## 2023-10-02 ENCOUNTER — HOSPITAL ENCOUNTER (OUTPATIENT)
Dept: RADIOLOGY | Facility: HOSPITAL | Age: 48
Discharge: HOME | End: 2023-10-02
Attending: PODIATRIST
Payer: MEDICAID

## 2023-10-02 DIAGNOSIS — M79.672 LEFT FOOT PAIN: ICD-10-CM

## 2023-10-04 ENCOUNTER — TELEPHONE (OUTPATIENT)
Dept: PODIATRY | Facility: CLINIC | Age: 48
End: 2023-10-04
Payer: COMMERCIAL

## 2023-10-04 ENCOUNTER — HOSPITAL ENCOUNTER (OUTPATIENT)
Dept: RADIOLOGY | Facility: HOSPITAL | Age: 48
Discharge: HOME | End: 2023-10-04
Attending: NURSE PRACTITIONER
Payer: COMMERCIAL

## 2023-10-04 DIAGNOSIS — R56.9 SEIZURE-LIKE ACTIVITY: ICD-10-CM

## 2023-10-04 DIAGNOSIS — M79.672 LEFT FOOT PAIN: Primary | ICD-10-CM

## 2023-10-04 PROCEDURE — 70551 MRI BRAIN STEM W/O DYE: CPT | Mod: TC,PO

## 2023-10-04 NOTE — TELEPHONE ENCOUNTER
----- Message from Heather Colbert sent at 10/4/2023 11:23 AM CDT -----  Regarding: Need updated order for MRI  PT called and said she went to get her MRI and ft was so infected she was told she will need MRI w/contrast. So she needs orders for MRI with Contrast. Her next appt with us is on 10/09/23. I tried calling Ms. Holm and there was no answer and mail box was full, so could not leave a message.     Thank you, Heather

## 2023-10-09 ENCOUNTER — OFFICE VISIT (OUTPATIENT)
Dept: PODIATRY | Facility: CLINIC | Age: 48
End: 2023-10-09
Payer: COMMERCIAL

## 2023-10-09 VITALS — HEIGHT: 65 IN | WEIGHT: 240 LBS | BODY MASS INDEX: 39.99 KG/M2

## 2023-10-09 DIAGNOSIS — R26.2 DIFFICULTY IN WALKING, NOT ELSEWHERE CLASSIFIED: ICD-10-CM

## 2023-10-09 DIAGNOSIS — M79.89 SWELLING OF LEFT FOOT: ICD-10-CM

## 2023-10-09 DIAGNOSIS — M79.672 LEFT FOOT PAIN: Primary | ICD-10-CM

## 2023-10-09 DIAGNOSIS — L30.9 DERMATITIS: ICD-10-CM

## 2023-10-09 DIAGNOSIS — L03.116 CELLULITIS OF LEFT FOOT: ICD-10-CM

## 2023-10-09 PROCEDURE — 1160F RVW MEDS BY RX/DR IN RCRD: CPT | Mod: CPTII,S$PBB,, | Performed by: PODIATRIST

## 2023-10-09 PROCEDURE — 3008F BODY MASS INDEX DOCD: CPT | Mod: CPTII,S$PBB,, | Performed by: PODIATRIST

## 2023-10-09 PROCEDURE — 99999 PR PBB SHADOW E&M-EST. PATIENT-LVL III: ICD-10-PCS | Mod: PBBFAC,,, | Performed by: PODIATRIST

## 2023-10-09 PROCEDURE — 99213 PR OFFICE/OUTPT VISIT, EST, LEVL III, 20-29 MIN: ICD-10-PCS | Mod: S$PBB,,, | Performed by: PODIATRIST

## 2023-10-09 PROCEDURE — 1159F MED LIST DOCD IN RCRD: CPT | Mod: CPTII,S$PBB,, | Performed by: PODIATRIST

## 2023-10-09 PROCEDURE — 1159F PR MEDICATION LIST DOCUMENTED IN MEDICAL RECORD: ICD-10-PCS | Mod: CPTII,S$PBB,, | Performed by: PODIATRIST

## 2023-10-09 PROCEDURE — 99999 PR PBB SHADOW E&M-EST. PATIENT-LVL III: CPT | Mod: PBBFAC,,, | Performed by: PODIATRIST

## 2023-10-09 PROCEDURE — 3008F PR BODY MASS INDEX (BMI) DOCUMENTED: ICD-10-PCS | Mod: CPTII,S$PBB,, | Performed by: PODIATRIST

## 2023-10-09 PROCEDURE — 99213 OFFICE O/P EST LOW 20 MIN: CPT | Mod: S$PBB,,, | Performed by: PODIATRIST

## 2023-10-09 PROCEDURE — 1160F PR REVIEW ALL MEDS BY PRESCRIBER/CLIN PHARMACIST DOCUMENTED: ICD-10-PCS | Mod: CPTII,S$PBB,, | Performed by: PODIATRIST

## 2023-10-09 RX ORDER — LACOSAMIDE 50 MG/1
50 TABLET ORAL EVERY 12 HOURS
COMMUNITY
Start: 2023-09-08

## 2023-10-09 RX ORDER — ESCITALOPRAM OXALATE 5 MG/1
10 TABLET ORAL NIGHTLY
COMMUNITY
Start: 2023-10-07 | End: 2024-02-01

## 2023-10-09 NOTE — PROGRESS NOTES
"  [address]  [place] [address]  Phone: [phone]   [phone]    Patient's PCP:Naif Castro MD  Referring Provider: No ref. provider found    Subjective:      Chief Complaint:: Foot Pain (Left foot )    WILVER Holm is a 47 y.o. female who presents today for follow up of left foot. Pt states she is no longer taking oral meds. No longer wearing boot due to foot swelling and causing rubbing.  She states she finished the topical steroid which is previously prescribed by Dermatology.  She states that the skin had fully peeled in his now begun to peel again.  She states she is having increased swelling in the forefoot.  Patient has previously ordered MRI was postponed due to update to with and without contrast.        Vitals:    10/09/23 1039   Weight: 108.9 kg (240 lb)   Height: 5' 5" (1.651 m)   PainSc:   7      Shoe Size: 7.5-8    Past Surgical History:   Procedure Laterality Date    HYSTERECTOMY      partical     History reviewed. No pertinent past medical history.  History reviewed. No pertinent family history.     Social History:   Marital Status: Legally   Alcohol History:  has no history on file for alcohol use.  Tobacco History:  has no history on file for tobacco use.  Drug History:  has no history on file for drug use.    Review of patient's allergies indicates:   Allergen Reactions    Iodine Swelling    Tramadol Anxiety, Diarrhea, Edema, Hallucinations, Other (See Comments), Palpitations, Shortness Of Breath and Swelling    Mobic [meloxicam]     Pcn [penicillins]     Sulfa (sulfonamide antibiotics)     Norco [hydrocodone-acetaminophen] Itching and Rash       Current Outpatient Medications   Medication Sig Dispense Refill    acetaminophen (TYLENOL) 325 mg Cap       amLODIPine (NORVASC) 5 MG tablet Take 5 mg by mouth.      diphenhydrAMINE (BENADRYL) 25 mg capsule       EScitalopram oxalate (LEXAPRO) 5 MG Tab Take 5 mg by mouth.      lacosamide (VIMPAT) 50 mg " Tab Take by mouth.       No current facility-administered medications for this visit.       Review of Systems   Constitutional:  Negative for chills, fatigue, fever and unexpected weight change.   HENT:  Negative for hearing loss and trouble swallowing.    Eyes:  Negative for photophobia and visual disturbance.   Respiratory:  Negative for cough, shortness of breath and wheezing.    Cardiovascular:  Negative for chest pain, palpitations and leg swelling.   Gastrointestinal:  Negative for abdominal pain and nausea.   Genitourinary:  Negative for dysuria and frequency.   Musculoskeletal:  Positive for arthralgias, gait problem and joint swelling. Negative for back pain, myalgias and neck pain.   Skin:  Positive for color change. Negative for rash and wound.   Neurological:  Negative for tremors, seizures, weakness, numbness and headaches.   Hematological:  Does not bruise/bleed easily.         Objective:        Physical Exam:   Foot Exam    General  Orientation: alert and oriented to person, place, and time   Affect: appropriate   Gait: antalgic   Assistance: wheelchair use       Left Foot/Ankle      Inspection and Palpation  Ecchymosis: none  Tenderness: (Plantar foot - 5th toe/mpj)  Swelling: (Mild lower extremity edema)  Arch: normal  Hammertoes: absent  Claw toes: absent  Hallux valgus: no  Hallux limitus: no  Skin Exam: callus, dry skin and skin changes; no drainage, no ulcer and no erythema   Neurovascular  Dorsalis pedis: 2+  Posterior tibial: 2+  Capillary refill: 2+  Varicose veins: present  Saphenous nerve sensation: normal  Tibial nerve sensation: normal  Superficial peroneal nerve sensation: normal  Deep peroneal nerve sensation: normal  Sural nerve sensation: normal    Muscle Strength  Ankle dorsiflexion: 5  Ankle plantar flexion: 5  Ankle inversion: 5  Ankle eversion: 5  Great toe extension: 5  Great toe flexion: 5    Range of Motion    Normal left ankle ROM    Tests  Anterior drawer: negative   Talar  tilt: negative   PT Tinel's sign: negative  Paresthesia: negative  Comments  Skin is significantly improved from previous exam    Physical Exam  Cardiovascular:      Pulses:           Dorsalis pedis pulses are 2+ on the left side.        Posterior tibial pulses are 2+ on the left side.   Musculoskeletal:      Left foot: No bunion.   Feet:      Left foot:      Skin integrity: Callus and dry skin present. No ulcer or erythema.               Left Ankle/Foot Exam     Range of Motion   The patient has normal left ankle ROM.     Comments:  Skin is significantly improved from previous exam      Muscle Strength   Left Lower Extremity   Ankle Dorsiflexion:  5   Plantar flexion:  5/5     Vascular Exam       Left Pulses  Dorsalis Pedis:      2+  Posterior Tibial:      2+           Imaging: none                      Assessment:       1. Left foot pain    2. Cellulitis of left foot    3. Dermatitis    4. Swelling of left foot    5. Difficulty in walking, not elsewhere classified      Plan:   Left foot pain    Cellulitis of left foot    Dermatitis  -     Ambulatory referral/consult to Dermatology; Future; Expected date: 10/16/2023    Swelling of left foot    Difficulty in walking, not elsewhere classified      Follow up if symptoms worsen or fail to improve, for pending MRI results.    Procedures        I discussed with the patient that her right order had been updated last week.  I gave her an updated copy of the order instructed her to call the imaging center to schedule.    Furthermore, I explained that this still appears to be primarily a dermatologic issue.  Given no improvement from the previous course of topical steroids I am going to place a new referral to Dermatology for another opinion and recommendations.      Counseling:     I provided patient education verbally regarding:   Patient diagnosis, treatment options, as well as alternatives, risks, and benefits.     This note was created using Dragon voice recognition  software that occasionally misinterpreted phrases or words.

## 2023-10-10 ENCOUNTER — PATIENT MESSAGE (OUTPATIENT)
Dept: ADMINISTRATIVE | Facility: CLINIC | Age: 48
End: 2023-10-10
Payer: COMMERCIAL

## 2023-10-10 ENCOUNTER — TELEPHONE (OUTPATIENT)
Dept: ADMINISTRATIVE | Facility: CLINIC | Age: 48
End: 2023-10-10
Payer: COMMERCIAL

## 2023-10-16 ENCOUNTER — TELEPHONE (OUTPATIENT)
Dept: FAMILY MEDICINE | Facility: CLINIC | Age: 48
End: 2023-10-16
Payer: COMMERCIAL

## 2023-10-16 NOTE — TELEPHONE ENCOUNTER
Called and spoke to pt's sister about setting up Dermatology appt p/Referral  Says has been seen externally

## 2023-10-26 ENCOUNTER — TELEPHONE (OUTPATIENT)
Dept: PODIATRY | Facility: CLINIC | Age: 48
End: 2023-10-26
Payer: COMMERCIAL

## 2023-10-26 NOTE — TELEPHONE ENCOUNTER
Spoke to patients insurance company to set up a peer to peer and was told they need all records faxed to [phone]. They would make a decision and get back to us. The phone number to call and check on the status of the case is [phone]. tracking# [id number].

## 2023-11-12 ENCOUNTER — HOSPITAL ENCOUNTER (OUTPATIENT)
Facility: HOSPITAL | Age: 48
Discharge: HOME | End: 2023-11-14
Attending: EMERGENCY MEDICINE | Admitting: INTERNAL MEDICINE
Payer: MEDICAID

## 2023-11-12 DIAGNOSIS — I50.9 CHF (CONGESTIVE HEART FAILURE): ICD-10-CM

## 2023-11-12 DIAGNOSIS — R06.02 SHORTNESS OF BREATH: ICD-10-CM

## 2023-11-12 DIAGNOSIS — R60.0 BILATERAL LOWER EXTREMITY EDEMA: Primary | ICD-10-CM

## 2023-11-12 DIAGNOSIS — L03.119 CELLULITIS OF FOOT: ICD-10-CM

## 2023-11-12 DIAGNOSIS — L08.9 LEFT FOOT INFECTION: ICD-10-CM

## 2023-11-12 LAB
ALBUMIN SERPL BCP-MCNC: 4.3 G/DL (ref 3.5–5.2)
ALP SERPL-CCNC: 95 U/L (ref 55–135)
ALT SERPL W/O P-5'-P-CCNC: 40 U/L (ref 10–44)
ANION GAP SERPL CALC-SCNC: 6 MMOL/L (ref 8–16)
AST SERPL-CCNC: 23 U/L (ref 10–40)
BASOPHILS # BLD AUTO: 0.12 K/UL (ref 0–0.2)
BASOPHILS NFR BLD: 0.6 % (ref 0–1.9)
BILIRUB SERPL-MCNC: 0.3 MG/DL (ref 0.1–1)
BILIRUB UR QL STRIP: NEGATIVE
BNP SERPL-MCNC: 51 PG/ML (ref 0–99)
BUN SERPL-MCNC: 17 MG/DL (ref 6–20)
CALCIUM SERPL-MCNC: 9.3 MG/DL (ref 8.7–10.5)
CHLORIDE SERPL-SCNC: 104 MMOL/L (ref 95–110)
CLARITY UR: CLEAR
CO2 SERPL-SCNC: 29 MMOL/L (ref 23–29)
COLOR UR: YELLOW
CREAT SERPL-MCNC: 0.7 MG/DL (ref 0.5–1.4)
DIFFERENTIAL METHOD: ABNORMAL
EOSINOPHIL # BLD AUTO: 0.2 K/UL (ref 0–0.5)
EOSINOPHIL NFR BLD: 0.9 % (ref 0–8)
ERYTHROCYTE [DISTWIDTH] IN BLOOD BY AUTOMATED COUNT: 14.1 % (ref 11.5–14.5)
EST. GFR  (NO RACE VARIABLE): >60 ML/MIN/1.73 M^2
GLUCOSE SERPL-MCNC: 101 MG/DL (ref 70–110)
GLUCOSE UR QL STRIP: NEGATIVE
HCT VFR BLD AUTO: 39.6 % (ref 37–48.5)
HGB BLD-MCNC: 12.7 G/DL (ref 12–16)
HGB UR QL STRIP: NEGATIVE
IMM GRANULOCYTES # BLD AUTO: 0.26 K/UL (ref 0–0.04)
IMM GRANULOCYTES NFR BLD AUTO: 1.4 % (ref 0–0.5)
INFLUENZA A, MOLECULAR: NEGATIVE
INFLUENZA B, MOLECULAR: NEGATIVE
KETONES UR QL STRIP: NEGATIVE
LEUKOCYTE ESTERASE UR QL STRIP: NEGATIVE
LYMPHOCYTES # BLD AUTO: 4.2 K/UL (ref 1–4.8)
LYMPHOCYTES NFR BLD: 22.6 % (ref 18–48)
MAGNESIUM SERPL-MCNC: 2 MG/DL (ref 1.6–2.6)
MCH RBC QN AUTO: 29.5 PG (ref 27–31)
MCHC RBC AUTO-ENTMCNC: 32.1 G/DL (ref 32–36)
MCV RBC AUTO: 92 FL (ref 82–98)
MONOCYTES # BLD AUTO: 1.1 K/UL (ref 0.3–1)
MONOCYTES NFR BLD: 6.1 % (ref 4–15)
NEUTROPHILS # BLD AUTO: 12.8 K/UL (ref 1.8–7.7)
NEUTROPHILS NFR BLD: 68.4 % (ref 38–73)
NITRITE UR QL STRIP: NEGATIVE
NRBC BLD-RTO: 0 /100 WBC
PH UR STRIP: 7 [PH] (ref 5–8)
PLATELET # BLD AUTO: 322 K/UL (ref 150–450)
PMV BLD AUTO: 10.7 FL (ref 9.2–12.9)
POTASSIUM SERPL-SCNC: 4.6 MMOL/L (ref 3.5–5.1)
PROT SERPL-MCNC: 7.7 G/DL (ref 6–8.4)
PROT UR QL STRIP: NEGATIVE
RBC # BLD AUTO: 4.3 M/UL (ref 4–5.4)
SARS-COV-2 RDRP RESP QL NAA+PROBE: NEGATIVE
SODIUM SERPL-SCNC: 139 MMOL/L (ref 136–145)
SP GR UR STRIP: 1.01 (ref 1–1.03)
SPECIMEN SOURCE: NORMAL
TROPONIN I SERPL HS-MCNC: 4.4 PG/ML (ref 0–14.9)
URN SPEC COLLECT METH UR: NORMAL
UROBILINOGEN UR STRIP-ACNC: NEGATIVE EU/DL
WBC # BLD AUTO: 18.65 K/UL (ref 3.9–12.7)

## 2023-11-12 PROCEDURE — 99285 EMERGENCY DEPT VISIT HI MDM: CPT | Mod: 25

## 2023-11-12 PROCEDURE — 25500020 PHARM REV CODE 255: Performed by: EMERGENCY MEDICINE

## 2023-11-12 PROCEDURE — 93005 ELECTROCARDIOGRAM TRACING: CPT | Performed by: INTERNAL MEDICINE

## 2023-11-12 PROCEDURE — 84484 ASSAY OF TROPONIN QUANT: CPT | Performed by: EMERGENCY MEDICINE

## 2023-11-12 PROCEDURE — 80053 COMPREHEN METABOLIC PANEL: CPT | Performed by: EMERGENCY MEDICINE

## 2023-11-12 PROCEDURE — 83880 ASSAY OF NATRIURETIC PEPTIDE: CPT | Performed by: EMERGENCY MEDICINE

## 2023-11-12 PROCEDURE — 85025 COMPLETE CBC W/AUTO DIFF WBC: CPT | Performed by: EMERGENCY MEDICINE

## 2023-11-12 PROCEDURE — 63600175 PHARM REV CODE 636 W HCPCS: Performed by: EMERGENCY MEDICINE

## 2023-11-12 PROCEDURE — 87502 INFLUENZA DNA AMP PROBE: CPT | Performed by: EMERGENCY MEDICINE

## 2023-11-12 PROCEDURE — U0002 COVID-19 LAB TEST NON-CDC: HCPCS | Performed by: EMERGENCY MEDICINE

## 2023-11-12 PROCEDURE — 96375 TX/PRO/DX INJ NEW DRUG ADDON: CPT

## 2023-11-12 PROCEDURE — 93010 ELECTROCARDIOGRAM REPORT: CPT | Mod: ,,, | Performed by: INTERNAL MEDICINE

## 2023-11-12 PROCEDURE — 81003 URINALYSIS AUTO W/O SCOPE: CPT | Performed by: EMERGENCY MEDICINE

## 2023-11-12 PROCEDURE — 93010 EKG 12-LEAD: ICD-10-PCS | Mod: ,,, | Performed by: INTERNAL MEDICINE

## 2023-11-12 PROCEDURE — 83735 ASSAY OF MAGNESIUM: CPT | Performed by: EMERGENCY MEDICINE

## 2023-11-12 RX ORDER — FUROSEMIDE 10 MG/ML
40 INJECTION INTRAMUSCULAR; INTRAVENOUS
Status: COMPLETED | OUTPATIENT
Start: 2023-11-13 | End: 2023-11-12

## 2023-11-12 RX ORDER — VANCOMYCIN HCL IN 5 % DEXTROSE 1G/250ML
2000 PLASTIC BAG, INJECTION (ML) INTRAVENOUS ONCE
Status: DISCONTINUED | OUTPATIENT
Start: 2023-11-13 | End: 2023-11-12

## 2023-11-12 RX ORDER — ACETAMINOPHEN 500 MG
1000 TABLET ORAL
Status: COMPLETED | OUTPATIENT
Start: 2023-11-13 | End: 2023-11-13

## 2023-11-12 RX ORDER — VANCOMYCIN HCL IN 5 % DEXTROSE 1G/250ML
2000 PLASTIC BAG, INJECTION (ML) INTRAVENOUS ONCE
Status: COMPLETED | OUTPATIENT
Start: 2023-11-13 | End: 2023-11-13

## 2023-11-12 RX ADMIN — IOHEXOL 100 ML: 350 INJECTION, SOLUTION INTRAVENOUS at 10:11

## 2023-11-12 RX ADMIN — FUROSEMIDE 40 MG: 10 INJECTION, SOLUTION INTRAMUSCULAR; INTRAVENOUS at 11:11

## 2023-11-13 ENCOUNTER — CLINICAL SUPPORT (OUTPATIENT)
Dept: CARDIOLOGY | Facility: HOSPITAL | Age: 48
End: 2023-11-13
Attending: STUDENT IN AN ORGANIZED HEALTH CARE EDUCATION/TRAINING PROGRAM
Payer: MEDICAID

## 2023-11-13 VITALS — BODY MASS INDEX: 40 KG/M2 | HEIGHT: 65 IN | WEIGHT: 240.06 LBS

## 2023-11-13 PROBLEM — L08.9 LEFT FOOT INFECTION: Status: ACTIVE | Noted: 2023-11-13

## 2023-11-13 PROBLEM — R06.02 SOB (SHORTNESS OF BREATH): Status: ACTIVE | Noted: 2023-11-13

## 2023-11-13 PROBLEM — G40.909 EPILEPSY: Status: ACTIVE | Noted: 2023-11-13

## 2023-11-13 PROBLEM — R60.0 LOWER EXTREMITY EDEMA: Status: ACTIVE | Noted: 2023-11-13

## 2023-11-13 PROBLEM — B02.9 SHINGLES: Status: ACTIVE | Noted: 2023-11-13

## 2023-11-13 PROBLEM — I10 HTN (HYPERTENSION): Status: ACTIVE | Noted: 2023-11-13

## 2023-11-13 LAB
ANION GAP SERPL CALC-SCNC: 6 MMOL/L (ref 8–16)
AORTIC ROOT ANNULUS: 2.9 CM
AORTIC VALVE CUSP SEPERATION: 1.9 CM
ASCENDING AORTA: 3.1 CM
AV INDEX (PROSTH): 0.95
AV MEAN GRADIENT: 12 MMHG
AV PEAK GRADIENT: 23 MMHG
AV VALVE AREA BY VELOCITY RATIO: 1.98 CM²
AV VALVE AREA: 2.42 CM²
AV VELOCITY RATIO: 0.78
BASOPHILS # BLD AUTO: 0.14 K/UL (ref 0–0.2)
BASOPHILS NFR BLD: 0.7 % (ref 0–1.9)
BSA FOR ECHO PROCEDURE: 2.23 M2
BUN SERPL-MCNC: 18 MG/DL (ref 6–20)
CALCIUM SERPL-MCNC: 9.2 MG/DL (ref 8.7–10.5)
CHLORIDE SERPL-SCNC: 99 MMOL/L (ref 95–110)
CO2 SERPL-SCNC: 34 MMOL/L (ref 23–29)
CREAT SERPL-MCNC: 1.1 MG/DL (ref 0.5–1.4)
CV ECHO LV RWT: 0.42 CM
DIFFERENTIAL METHOD: ABNORMAL
DOP CALC AO PEAK VEL: 2.38 M/S
DOP CALC AO VTI: 38.5 CM
DOP CALC LVOT AREA: 2.5 CM2
DOP CALC LVOT DIAMETER: 1.8 CM
DOP CALC LVOT PEAK VEL: 1.85 M/S
DOP CALC LVOT STROKE VOLUME: 93.09 CM3
DOP CALC MV VTI: 39.8 CM
DOP CALCLVOT PEAK VEL VTI: 36.6 CM
E WAVE DECELERATION TIME: 292 MSEC
E/A RATIO: 0.88
E/E' RATIO: 10.08 M/S
ECHO LV POSTERIOR WALL: 0.98 CM (ref 0.6–1.1)
EOSINOPHIL # BLD AUTO: 0.2 K/UL (ref 0–0.5)
EOSINOPHIL NFR BLD: 1.2 % (ref 0–8)
ERYTHROCYTE [DISTWIDTH] IN BLOOD BY AUTOMATED COUNT: 14 % (ref 11.5–14.5)
EST. GFR  (NO RACE VARIABLE): >60 ML/MIN/1.73 M^2
FRACTIONAL SHORTENING: 48 % (ref 28–44)
GLUCOSE SERPL-MCNC: 107 MG/DL (ref 70–110)
HCT VFR BLD AUTO: 38.5 % (ref 37–48.5)
HGB BLD-MCNC: 12.3 G/DL (ref 12–16)
IMM GRANULOCYTES # BLD AUTO: 0.24 K/UL (ref 0–0.04)
IMM GRANULOCYTES NFR BLD AUTO: 1.2 % (ref 0–0.5)
INTERVENTRICULAR SEPTUM: 1.06 CM (ref 0.6–1.1)
IVC DIAMETER: 2.57 CM
LACTATE SERPL-SCNC: 1.2 MMOL/L (ref 0.5–1.9)
LEFT ATRIUM SIZE: 4 CM
LEFT INTERNAL DIMENSION IN SYSTOLE: 2.44 CM (ref 2.1–4)
LEFT VENTRICLE DIASTOLIC VOLUME INDEX: 48.13 ML/M2
LEFT VENTRICLE DIASTOLIC VOLUME: 103 ML
LEFT VENTRICLE MASS INDEX: 79 G/M2
LEFT VENTRICLE SYSTOLIC VOLUME INDEX: 9.8 ML/M2
LEFT VENTRICLE SYSTOLIC VOLUME: 21 ML
LEFT VENTRICULAR INTERNAL DIMENSION IN DIASTOLE: 4.72 CM (ref 3.5–6)
LEFT VENTRICULAR MASS: 170.13 G
LV LATERAL E/E' RATIO: 9.69 M/S
LV SEPTAL E/E' RATIO: 10.5 M/S
LVOT MG: 7 MMHG
LVOT MV: 1.24 CM/S
LYMPHOCYTES # BLD AUTO: 5.8 K/UL (ref 1–4.8)
LYMPHOCYTES NFR BLD: 28.9 % (ref 18–48)
MCH RBC QN AUTO: 29.8 PG (ref 27–31)
MCHC RBC AUTO-ENTMCNC: 31.9 G/DL (ref 32–36)
MCV RBC AUTO: 93 FL (ref 82–98)
MONOCYTES # BLD AUTO: 1.2 K/UL (ref 0.3–1)
MONOCYTES NFR BLD: 6 % (ref 4–15)
MRSA SCREEN BY PCR: NEGATIVE
MV MEAN GRADIENT: 4 MMHG
MV PEAK A VEL: 1.44 M/S
MV PEAK E VEL: 1.26 M/S
MV PEAK GRADIENT: 7 MMHG
MV STENOSIS PRESSURE HALF TIME: 85 MS
MV VALVE AREA BY CONTINUITY EQUATION: 2.34 CM2
MV VALVE AREA P 1/2 METHOD: 2.59 CM2
NEUTROPHILS # BLD AUTO: 12.4 K/UL (ref 1.8–7.7)
NEUTROPHILS NFR BLD: 62 % (ref 38–73)
NRBC BLD-RTO: 0 /100 WBC
PLATELET # BLD AUTO: 330 K/UL (ref 150–450)
PMV BLD AUTO: 10.6 FL (ref 9.2–12.9)
POTASSIUM SERPL-SCNC: 4.5 MMOL/L (ref 3.5–5.1)
PV MV: 0.8 M/S
PV PEAK GRADIENT: 5 MMHG
PV PEAK VELOCITY: 1.14 M/S
RA PRESSURE ESTIMATED: 3 MMHG
RBC # BLD AUTO: 4.13 M/UL (ref 4–5.4)
RIGHT VENTRICULAR END-DIASTOLIC DIMENSION: 2.43 CM
RV TISSUE DOPPLER FREE WALL SYSTOLIC VELOCITY 1 (APICAL 4 CHAMBER VIEW): 24.1 CM/S
SODIUM SERPL-SCNC: 139 MMOL/L (ref 136–145)
TDI LATERAL: 0.13 M/S
TDI SEPTAL: 0.12 M/S
TDI: 0.13 M/S
WBC # BLD AUTO: 19.92 K/UL (ref 3.9–12.7)
Z-SCORE OF LEFT VENTRICULAR DIMENSION IN END DIASTOLE: -3.77
Z-SCORE OF LEFT VENTRICULAR DIMENSION IN END SYSTOLE: -4.29

## 2023-11-13 PROCEDURE — 25000003 PHARM REV CODE 250: Performed by: INTERNAL MEDICINE

## 2023-11-13 PROCEDURE — 25000242 PHARM REV CODE 250 ALT 637 W/ HCPCS: Performed by: STUDENT IN AN ORGANIZED HEALTH CARE EDUCATION/TRAINING PROGRAM

## 2023-11-13 PROCEDURE — 25000003 PHARM REV CODE 250: Performed by: EMERGENCY MEDICINE

## 2023-11-13 PROCEDURE — 36415 COLL VENOUS BLD VENIPUNCTURE: CPT | Performed by: STUDENT IN AN ORGANIZED HEALTH CARE EDUCATION/TRAINING PROGRAM

## 2023-11-13 PROCEDURE — 93306 ECHO (CUPID ONLY): ICD-10-PCS | Mod: 26,,, | Performed by: INTERNAL MEDICINE

## 2023-11-13 PROCEDURE — 93306 TTE W/DOPPLER COMPLETE: CPT | Mod: 26,,, | Performed by: INTERNAL MEDICINE

## 2023-11-13 PROCEDURE — 96366 THER/PROPH/DIAG IV INF ADDON: CPT

## 2023-11-13 PROCEDURE — 87040 BLOOD CULTURE FOR BACTERIA: CPT | Performed by: STUDENT IN AN ORGANIZED HEALTH CARE EDUCATION/TRAINING PROGRAM

## 2023-11-13 PROCEDURE — 99900031 HC PATIENT EDUCATION (STAT)

## 2023-11-13 PROCEDURE — 63600175 PHARM REV CODE 636 W HCPCS: Performed by: STUDENT IN AN ORGANIZED HEALTH CARE EDUCATION/TRAINING PROGRAM

## 2023-11-13 PROCEDURE — 96365 THER/PROPH/DIAG IV INF INIT: CPT | Mod: 59

## 2023-11-13 PROCEDURE — 25000003 PHARM REV CODE 250: Performed by: STUDENT IN AN ORGANIZED HEALTH CARE EDUCATION/TRAINING PROGRAM

## 2023-11-13 PROCEDURE — 94640 AIRWAY INHALATION TREATMENT: CPT

## 2023-11-13 PROCEDURE — 93306 TTE W/DOPPLER COMPLETE: CPT

## 2023-11-13 PROCEDURE — 87641 MR-STAPH DNA AMP PROBE: CPT | Performed by: STUDENT IN AN ORGANIZED HEALTH CARE EDUCATION/TRAINING PROGRAM

## 2023-11-13 PROCEDURE — 99900035 HC TECH TIME PER 15 MIN (STAT)

## 2023-11-13 PROCEDURE — 12000002 HC ACUTE/MED SURGE SEMI-PRIVATE ROOM

## 2023-11-13 PROCEDURE — 96367 TX/PROPH/DG ADDL SEQ IV INF: CPT

## 2023-11-13 PROCEDURE — S4991 NICOTINE PATCH NONLEGEND: HCPCS | Performed by: INTERNAL MEDICINE

## 2023-11-13 PROCEDURE — G0378 HOSPITAL OBSERVATION PER HR: HCPCS

## 2023-11-13 PROCEDURE — 85025 COMPLETE CBC W/AUTO DIFF WBC: CPT | Performed by: STUDENT IN AN ORGANIZED HEALTH CARE EDUCATION/TRAINING PROGRAM

## 2023-11-13 PROCEDURE — 96375 TX/PRO/DX INJ NEW DRUG ADDON: CPT

## 2023-11-13 PROCEDURE — 94761 N-INVAS EAR/PLS OXIMETRY MLT: CPT

## 2023-11-13 PROCEDURE — 94799 UNLISTED PULMONARY SVC/PX: CPT

## 2023-11-13 PROCEDURE — 83605 ASSAY OF LACTIC ACID: CPT | Performed by: STUDENT IN AN ORGANIZED HEALTH CARE EDUCATION/TRAINING PROGRAM

## 2023-11-13 PROCEDURE — 80048 BASIC METABOLIC PNL TOTAL CA: CPT | Performed by: STUDENT IN AN ORGANIZED HEALTH CARE EDUCATION/TRAINING PROGRAM

## 2023-11-13 PROCEDURE — 63600175 PHARM REV CODE 636 W HCPCS: Performed by: EMERGENCY MEDICINE

## 2023-11-13 RX ORDER — TALC
6 POWDER (GRAM) TOPICAL NIGHTLY PRN
Status: DISCONTINUED | OUTPATIENT
Start: 2023-11-13 | End: 2023-11-14 | Stop reason: HOSPADM

## 2023-11-13 RX ORDER — ACYCLOVIR 400 MG/1
800 TABLET ORAL
Status: DISCONTINUED | OUTPATIENT
Start: 2023-11-13 | End: 2023-11-14 | Stop reason: HOSPADM

## 2023-11-13 RX ORDER — MUPIROCIN 20 MG/G
OINTMENT TOPICAL 2 TIMES DAILY
Status: DISCONTINUED | OUTPATIENT
Start: 2023-11-13 | End: 2023-11-14 | Stop reason: HOSPADM

## 2023-11-13 RX ORDER — LEVOFLOXACIN 5 MG/ML
750 INJECTION, SOLUTION INTRAVENOUS
Status: DISCONTINUED | OUTPATIENT
Start: 2023-11-13 | End: 2023-11-14 | Stop reason: HOSPADM

## 2023-11-13 RX ORDER — ESCITALOPRAM OXALATE 5 MG/1
5 TABLET ORAL DAILY
Status: DISCONTINUED | OUTPATIENT
Start: 2023-11-13 | End: 2023-11-13

## 2023-11-13 RX ORDER — AMLODIPINE BESYLATE 5 MG/1
5 TABLET ORAL DAILY
Status: DISCONTINUED | OUTPATIENT
Start: 2023-11-13 | End: 2023-11-13

## 2023-11-13 RX ORDER — ACYCLOVIR 800 MG/1
200 TABLET ORAL
COMMUNITY
End: 2023-11-13 | Stop reason: DRUGHIGH

## 2023-11-13 RX ORDER — GABAPENTIN 300 MG/1
300 CAPSULE ORAL 2 TIMES DAILY
COMMUNITY
Start: 2023-11-05 | End: 2023-12-05

## 2023-11-13 RX ORDER — OXYCODONE HYDROCHLORIDE 5 MG/1
5 TABLET ORAL EVERY 6 HOURS PRN
Status: DISCONTINUED | OUTPATIENT
Start: 2023-11-13 | End: 2023-11-14 | Stop reason: HOSPADM

## 2023-11-13 RX ORDER — AMLODIPINE BESYLATE 5 MG/1
10 TABLET ORAL DAILY
Status: DISCONTINUED | OUTPATIENT
Start: 2023-11-13 | End: 2023-11-14 | Stop reason: HOSPADM

## 2023-11-13 RX ORDER — IPRATROPIUM BROMIDE 0.5 MG/2.5ML
0.5 SOLUTION RESPIRATORY (INHALATION) EVERY 6 HOURS
Status: DISCONTINUED | OUTPATIENT
Start: 2023-11-13 | End: 2023-11-14 | Stop reason: HOSPADM

## 2023-11-13 RX ORDER — LACOSAMIDE 50 MG/1
50 TABLET ORAL EVERY 12 HOURS
Status: DISCONTINUED | OUTPATIENT
Start: 2023-11-13 | End: 2023-11-14 | Stop reason: HOSPADM

## 2023-11-13 RX ORDER — ACYCLOVIR 800 MG/1
800 TABLET ORAL
COMMUNITY
Start: 2023-11-05 | End: 2023-11-15

## 2023-11-13 RX ORDER — SODIUM CHLORIDE 0.9 % (FLUSH) 0.9 %
10 SYRINGE (ML) INJECTION
Status: DISCONTINUED | OUTPATIENT
Start: 2023-11-13 | End: 2023-11-14 | Stop reason: HOSPADM

## 2023-11-13 RX ORDER — ONDANSETRON 2 MG/ML
4 INJECTION INTRAMUSCULAR; INTRAVENOUS EVERY 8 HOURS PRN
Status: DISCONTINUED | OUTPATIENT
Start: 2023-11-13 | End: 2023-11-14 | Stop reason: HOSPADM

## 2023-11-13 RX ORDER — LACOSAMIDE 50 MG/1
50 TABLET ORAL EVERY 24 HOURS
Status: DISCONTINUED | OUTPATIENT
Start: 2023-11-13 | End: 2023-11-13

## 2023-11-13 RX ORDER — ACETAMINOPHEN 325 MG/1
650 TABLET ORAL EVERY 6 HOURS PRN
Status: DISCONTINUED | OUTPATIENT
Start: 2023-11-13 | End: 2023-11-14 | Stop reason: HOSPADM

## 2023-11-13 RX ORDER — ALBUTEROL SULFATE 0.83 MG/ML
2.5 SOLUTION RESPIRATORY (INHALATION)
Status: DISCONTINUED | OUTPATIENT
Start: 2023-11-13 | End: 2023-11-14 | Stop reason: HOSPADM

## 2023-11-13 RX ORDER — ENOXAPARIN SODIUM 100 MG/ML
40 INJECTION SUBCUTANEOUS EVERY 24 HOURS
Status: DISCONTINUED | OUTPATIENT
Start: 2023-11-13 | End: 2023-11-14 | Stop reason: HOSPADM

## 2023-11-13 RX ORDER — IBUPROFEN 200 MG
1 TABLET ORAL DAILY
Status: DISCONTINUED | OUTPATIENT
Start: 2023-11-13 | End: 2023-11-14 | Stop reason: HOSPADM

## 2023-11-13 RX ORDER — ESCITALOPRAM OXALATE 10 MG/1
10 TABLET ORAL DAILY
Status: DISCONTINUED | OUTPATIENT
Start: 2023-11-13 | End: 2023-11-14 | Stop reason: HOSPADM

## 2023-11-13 RX ADMIN — ACYCLOVIR 800 MG: 400 TABLET ORAL at 09:11

## 2023-11-13 RX ADMIN — Medication 6 MG: at 09:11

## 2023-11-13 RX ADMIN — IPRATROPIUM BROMIDE 0.5 MG: 0.5 SOLUTION RESPIRATORY (INHALATION) at 07:11

## 2023-11-13 RX ADMIN — ACYCLOVIR 800 MG: 400 TABLET ORAL at 10:11

## 2023-11-13 RX ADMIN — IPRATROPIUM BROMIDE 0.5 MG: 0.5 SOLUTION RESPIRATORY (INHALATION) at 01:11

## 2023-11-13 RX ADMIN — NICOTINE 1 PATCH: 14 PATCH, EXTENDED RELEASE TRANSDERMAL at 05:11

## 2023-11-13 RX ADMIN — OXYCODONE HYDROCHLORIDE 5 MG: 5 TABLET ORAL at 03:11

## 2023-11-13 RX ADMIN — OXYCODONE HYDROCHLORIDE 5 MG: 5 TABLET ORAL at 10:11

## 2023-11-13 RX ADMIN — MUPIROCIN 1 G: 20 OINTMENT TOPICAL at 10:11

## 2023-11-13 RX ADMIN — ACYCLOVIR 800 MG: 400 TABLET ORAL at 03:11

## 2023-11-13 RX ADMIN — VANCOMYCIN HYDROCHLORIDE 2000 MG: 1 INJECTION, POWDER, LYOPHILIZED, FOR SOLUTION INTRAVENOUS at 12:11

## 2023-11-13 RX ADMIN — ESCITALOPRAM OXALATE 10 MG: 10 TABLET ORAL at 10:11

## 2023-11-13 RX ADMIN — ACYCLOVIR 800 MG: 400 TABLET ORAL at 05:11

## 2023-11-13 RX ADMIN — OXYCODONE HYDROCHLORIDE 5 MG: 5 TABLET ORAL at 07:11

## 2023-11-13 RX ADMIN — ALBUTEROL SULFATE 2.5 MG: 2.5 SOLUTION RESPIRATORY (INHALATION) at 01:11

## 2023-11-13 RX ADMIN — AMLODIPINE BESYLATE 10 MG: 5 TABLET ORAL at 10:11

## 2023-11-13 RX ADMIN — LACOSAMIDE 50 MG: 50 TABLET, FILM COATED ORAL at 09:11

## 2023-11-13 RX ADMIN — ALBUTEROL SULFATE 2.5 MG: 2.5 SOLUTION RESPIRATORY (INHALATION) at 07:11

## 2023-11-13 RX ADMIN — LACOSAMIDE 50 MG: 50 TABLET, FILM COATED ORAL at 03:11

## 2023-11-13 RX ADMIN — ACYCLOVIR 800 MG: 400 TABLET ORAL at 06:11

## 2023-11-13 RX ADMIN — ACETAMINOPHEN 1000 MG: 500 TABLET ORAL at 12:11

## 2023-11-13 RX ADMIN — ACETAMINOPHEN 650 MG: 325 TABLET ORAL at 05:11

## 2023-11-13 RX ADMIN — MUPIROCIN 1 G: 20 OINTMENT TOPICAL at 09:11

## 2023-11-13 RX ADMIN — ONDANSETRON 4 MG: 2 INJECTION INTRAMUSCULAR; INTRAVENOUS at 07:11

## 2023-11-13 RX ADMIN — LEVOFLOXACIN 750 MG: 5 INJECTION, SOLUTION INTRAVENOUS at 02:11

## 2023-11-13 NOTE — ED PROVIDER NOTES
Encounter Date: 11/12/2023       History     Chief Complaint   Patient presents with    Leg Swelling     C/o LLE swelling x 7 months and RLE swelling x 4 day. Patient is wheel chair bound.      Patient presents emergency department with reported bilateral lower extremity swelling with shortness of breath over last 4 days associated orthopnea cough she is had some intermittent fevers as well cough is productive of a clear sputum she denies any history of congestive heart failure she reports history of hypertension seizure disorder she denies any recent changes in her medications she reports associated chest pain with shortness of breath described as a heaviness to her chest        Review of patient's allergies indicates:   Allergen Reactions    Iodine Swelling    Tramadol Anxiety, Diarrhea, Edema, Hallucinations, Other (See Comments), Palpitations, Shortness Of Breath and Swelling    Mobic [meloxicam]     Pcn [penicillins]     Sulfa (sulfonamide antibiotics)     Norco [hydrocodone-acetaminophen] Itching and Rash     No past medical history on file.  Past Surgical History:   Procedure Laterality Date    HYSTERECTOMY      partical     No family history on file.     Review of Systems   Constitutional:  Positive for fatigue. Negative for chills, diaphoresis and fever.   HENT:  Negative for congestion and ear pain.    Respiratory:  Positive for cough and shortness of breath.    Cardiovascular:  Positive for chest pain and leg swelling. Negative for palpitations.   Gastrointestinal:  Negative for abdominal pain, diarrhea, nausea and vomiting.   All other systems reviewed and are negative.      Physical Exam     Initial Vitals [11/12/23 2029]   BP Pulse Resp Temp SpO2   (!) 143/91 100 18 98.8 °F (37.1 °C) 98 %      MAP       --         Physical Exam    Constitutional: She appears well-developed and well-nourished. No distress.   HENT:   Head: Normocephalic and atraumatic.   Right Ear: External ear normal.   Left Ear:  External ear normal.   Mouth/Throat: Oropharynx is clear and moist.   Eyes: Conjunctivae and EOM are normal. Pupils are equal, round, and reactive to light.   Neck: Neck supple.   Normal range of motion.  Cardiovascular:  Normal rate, regular rhythm, normal heart sounds and intact distal pulses.           Pulmonary/Chest: She is in respiratory distress. She has rales.   Abdominal: Abdomen is soft. Bowel sounds are normal. There is no abdominal tenderness.   Musculoskeletal:         General: Edema present. Normal range of motion.      Cervical back: Normal range of motion and neck supple.     Neurological: She is alert and oriented to person, place, and time. GCS score is 15. GCS eye subscore is 4. GCS verbal subscore is 5. GCS motor subscore is 6.   Skin: Skin is warm and dry. Capillary refill takes less than 2 seconds. Rash noted.   Desquamation to the plantar aspect of the left foot with erythema   Psychiatric: She has a normal mood and affect. Her behavior is normal.         ED Course   Procedures  Labs Reviewed   CBC W/ AUTO DIFFERENTIAL - Abnormal; Notable for the following components:       Result Value    WBC 18.65 (*)     Immature Granulocytes 1.4 (*)     Gran # (ANC) 12.8 (*)     Immature Grans (Abs) 0.26 (*)     Mono # 1.1 (*)     All other components within normal limits   COMPREHENSIVE METABOLIC PANEL - Abnormal; Notable for the following components:    Anion Gap 6 (*)     All other components within normal limits   MRSA SCREEN BY PCR   TROPONIN I HIGH SENSITIVITY   B-TYPE NATRIURETIC PEPTIDE   MAGNESIUM   SARS-COV-2 RNA AMPLIFICATION, QUAL   INFLUENZA A AND B ANTIGEN    Narrative:     Specimen Source->Nasopharyngeal Swab   URINALYSIS, REFLEX TO URINE CULTURE    Narrative:     Specimen Source->Urine          Imaging Results              X-Ray Foot Complete Left (In process)  Result time 11/13/23 01:09:59   Procedure changed from X-Ray Foot 2 View Left                    CTA Chest Non-Coronary (PE  Studies) (Final result)  Result time 11/12/23 22:49:24      Final result by Sukhwinder Daniels MD (11/12/23 22:49:24)                   Narrative:    INDICATION: Pulmonary embolism (PE) suspected, high prob    EXAMINATION: CT ANGIOGRAM CHEST    TECHNIQUE:  The examination was performed with the intravenous administration of 100 mL Omnipaque 350.  Post-processing of the angiographic images was performed, with multiplanar reformation and 3D reconstruction.    Individualized dose optimization techniques were used for this CT.    COMPARISON: None.    FINDINGS:    PULMONARY ARTERIES: Normal in caliber. No pulmonary embolism.    AORTA AND GREAT VESSELS: Normal in caliber. No evidence of dissection.    HEART AND PERICARDIUM: Heart size is normal. No pericardial effusion.    LUNGS, PLEURA: No masses, consolidation, or edema. No pleural effusion or thickening. No pneumothorax.    SOFT TISSUES: No axillary adenopathy.    MEDIASTINUM AND CIRA: No mediastinal or hilar adenopathy. Esophagus is unremarkable. No hiatal hernia.    UPPER ABDOMEN: No acute pathology.    BONES: There is no evidence of fracture or focal bone lesion.    IMPRESSION:    Negative CTA Chest.    Electronically signed by:  Sukhwinder Daniels MD  11/12/2023 10:49 PM CST Workstation: [id number]                                     X-Ray Chest AP Portable (In process)                      Medications   vancomycin - pharmacy to dose (has no administration in time range)   vancomycin in dextrose 5 % 1 gram/250 mL IVPB 2,000 mg (2,000 mg Intravenous New Bag 11/13/23 0011)   albuterol nebulizer solution 2.5 mg (has no administration in time range)     And   ipratropium 0.02 % nebulizer solution 0.5 mg (0.5 mg Nebulization Given 11/13/23 0109)   oxyCODONE immediate release tablet 5 mg (has no administration in time range)   mupirocin 2 % ointment (has no administration in time range)   iohexoL (OMNIPAQUE 350) injection 100 mL (100 mLs Intravenous Given 11/12/23 8326)   furosemide  injection 40 mg (40 mg Intravenous Given 11/12/23 9331)   acetaminophen tablet 1,000 mg (1,000 mg Oral Given 11/13/23 0001)     Medical Decision Making  Laboratory evaluation reviewed CTA of the chest was obtained secondary to patient's elevated risk factors for pulmonary embolism CTA shows no evidence of blood clots patient's white blood cell count is elevated I have ordered vancomycin for treatment of her left foot I have discussed patient's findings with  who will evaluate patient in the emergency department for admission    Amount and/or Complexity of Data Reviewed  Labs: ordered.  Radiology: ordered.    Risk  OTC drugs.  Prescription drug management.  Decision regarding hospitalization.                               Clinical Impression:   Final diagnoses:  [R06.02] Shortness of breath  [R60.0] Bilateral lower extremity edema (Primary)  [L03.119] Cellulitis of foot        ED Disposition Condition    Admit Stable                Alexsander Olmedo MD  11/13/23 0118

## 2023-11-13 NOTE — SUBJECTIVE & OBJECTIVE
No past medical history on file.    Past Surgical History:   Procedure Laterality Date    HYSTERECTOMY      partical       Review of patient's allergies indicates:   Allergen Reactions    Iodine Swelling    Tramadol Anxiety, Diarrhea, Edema, Hallucinations, Other (See Comments), Palpitations, Shortness Of Breath and Swelling    Mobic [meloxicam]     Pcn [penicillins]     Sulfa (sulfonamide antibiotics)     Norco [hydrocodone-acetaminophen] Itching and Rash       No current facility-administered medications on file prior to encounter.     Current Outpatient Medications on File Prior to Encounter   Medication Sig    acetaminophen (TYLENOL) 325 mg Cap     amLODIPine (NORVASC) 5 MG tablet Take 5 mg by mouth.    EScitalopram oxalate (LEXAPRO) 5 MG Tab Take 5 mg by mouth.    lacosamide (VIMPAT) 50 mg Tab Take by mouth.     Family History    None       Tobacco Use    Smoking status: Not on file    Smokeless tobacco: Not on file   Substance and Sexual Activity    Alcohol use: Not on file    Drug use: Not on file    Sexual activity: Not on file     Review of Systems   Respiratory:  Positive for cough, shortness of breath and wheezing.    Cardiovascular:  Negative for chest pain.   Gastrointestinal:  Negative for abdominal pain and diarrhea.   Genitourinary:  Negative for dysuria.   Skin:  Positive for wound.     Objective:     Vital Signs (Most Recent):  Temp: 98.8 °F (37.1 °C) (11/12/23 2029)  Pulse: 93 (11/13/23 0109)  Resp: (!) 22 (11/13/23 0109)  BP: (!) 161/82 (11/13/23 0001)  SpO2: 96 % (11/13/23 0109) Vital Signs (24h Range):  Temp:  [98.8 °F (37.1 °C)] 98.8 °F (37.1 °C)  Pulse:  [] 93  Resp:  [15-29] 22  SpO2:  [96 %-98 %] 96 %  BP: (143-161)/(68-91) 161/82     Weight: 108.9 kg (240 lb)  Body mass index is 39.94 kg/m².     Physical Exam  Constitutional:       Appearance: She is obese.   HENT:      Head: Normocephalic and atraumatic.      Right Ear: External ear normal.      Left Ear: External ear normal.       Nose: Nose normal.      Mouth/Throat:      Mouth: Mucous membranes are moist.   Eyes:      Extraocular Movements: Extraocular movements intact.   Cardiovascular:      Rate and Rhythm: Normal rate and regular rhythm.      Heart sounds: No murmur heard.  Pulmonary:      Effort: Pulmonary effort is normal.      Comments: No crackles or wheezing appreciated   Good air flow  Abdominal:      General: Abdomen is flat. Bowel sounds are normal. There is no distension.   Musculoskeletal:      Right lower leg: Edema present.      Left lower leg: Edema present.   Skin:     Comments: Plantar surface of left foot w/sloughing of skin and erythema underneath, no purulent drainage noted   Neurological:      General: No focal deficit present.      Mental Status: She is alert. Mental status is at baseline.   Psychiatric:         Mood and Affect: Mood normal.         Behavior: Behavior normal.                Significant Labs: All pertinent labs within the past 24 hours have been reviewed.    Significant Imaging: I have reviewed all pertinent imaging results/findings within the past 24 hours.

## 2023-11-13 NOTE — CARE UPDATE
11/13/23 0714   Patient Assessment/Suction   Level of Consciousness (AVPU) alert   Respiratory Effort Normal;Unlabored   All Lung Fields Breath Sounds diminished   Rhythm/Pattern, Respiratory unlabored;pattern regular;depth regular;no shortness of breath reported   Cough Frequency no cough   PRE-TX-O2   Device (Oxygen Therapy) room air   SpO2 96 %   Pulse Oximetry Type Continuous   $ Pulse Oximetry - Multiple Charge Pulse Oximetry - Multiple   Pulse 75   Resp 19   Aerosol Therapy   $ Aerosol Therapy Charges Aerosol Treatment   Daily Review of Necessity (SVN) completed   Respiratory Treatment Status (SVN) given   Treatment Route (SVN) mask;oxygen   Patient Position (SVN) HOB elevated   Post Treatment Assessment (SVN) increased aeration   Signs of Intolerance (SVN) none   Breath Sounds Post-Respiratory Treatment   Throughout All Fields Post-Treatment All Fields   Throughout All Fields Post-Treatment aeration increased   Post-treatment Heart Rate (beats/min) 77   Post-treatment Resp Rate (breaths/min) 19

## 2023-11-13 NOTE — ASSESSMENT & PLAN NOTE
DDx includes undiagnosed CHF 2/2 PND, LE edema, orthopnea but BNP normal, no fluid on cxr, and no crackles on exam; COPD exacerbation 2/2 extensive smoking hx but no wheezing on exam. CTPE negative for consolidation or PE. On RA  -TTE in AM to better evaluate for CHF   -duonebs   -avoid steroids for now in setting of acute infection   -levoquin for possible COPD exacerbation  -s/p IV lasix 40mg in ED  -hold off on further diuresis until TTE results

## 2023-11-13 NOTE — PT/OT/SLP PROGRESS
Physical Therapy      Patient Name:  Eliezer Au   MRN:  [id number]    Patient not seen today secondary to Nursing care, Other (Comment) (Pt being prepped to move out of ICU to room.). Will follow-up 11/14/23.

## 2023-11-13 NOTE — PLAN OF CARE
Critical access hospital - Emergency Dept  Initial Discharge Assessment       Primary Care Provider: Naif Castro MD    Admission Diagnosis: Bilateral lower extremity edema [R60.0]    Admission Date: 11/12/2023  Expected Discharge Date:      met with Pt at bedside to complete discharge assessment. Pt AAOx4s. Demographics, PCP, and insurance verified. No home health. No dialysis. No coumadin. Pt reports unable to complete ADLs without assistance. Pt verbalized plan to discharge home via family transport. Pt has no other needs to be addressed at this time.      Transition of Care Barriers: Other (see comments) (Memory impairment)    Payor: MEDICAID / Plan: [facility] COMMUNITY PLAN hospitals [place] ([place] MEDICA[place]) / Product Type: Managed Medicaid /     Extended Emergency Contact Information  Primary Emergency Contact: Yumiko Castro  Mobile Phone: [phone]  Relation: Sister  Preferred language: English   needed? No    Discharge Plan A: Home with family  Discharge Plan B: Home      [id number] - [place], [place] - [address] AT [facility] [address]  [address]  [place] [address]  Phone: [phone] Fax: [phone]    [id number] - [place], [place] - [address] AT [facility] OF [facility]  [address]  [address]  [place] [address]  Phone: [phone] Fax: [phone]      Initial Assessment (most recent)       Adult Discharge Assessment - 11/13/23 0929          Discharge Assessment    Assessment Type Discharge Planning Assessment     Confirmed/corrected address, phone number and insurance Yes     Confirmed Demographics Correct on Facesheet     Source of Information patient     When was your last doctors appointment? --   Pt could not remember    Communicated IRIS with patient/caregiver Date not available/Unable to determine     Reason For Admission SOB (shortness of breath)     People in Home significant other;sibling(s)      Facility Arrived From: home     Do you expect to return to your current living situation? Yes     Do you have help at home or someone to help you manage your care at home? Yes     Who are your caregiver(s) and their phone number(s)? Luis Pillai/significant other [phone]     Prior to hospitilization cognitive status: Alert/Oriented     Current cognitive status: Alert/Oriented     Walking or Climbing Stairs ambulation difficulty, requires equipment     Mobility Management wheelchair and knee scooter     Dressing/Bathing bathing difficulty, requires equipment     Dressing/Bathing Management shower chair and bath bench     Home Accessibility wheelchair accessible     Equipment Currently Used at Home bath bench;wheelchair;shower chair     Readmission within 30 days? No     Patient currently being followed by outpatient case management? No     Do you currently have service(s) that help you manage your care at home? No     Do you take prescription medications? Yes     Do you have prescription coverage? Yes     Coverage Payor: MEDICAID - [facility] ([place] MEDICAID)     Do you have any problems affording any of your prescribed medications? No     Is the patient taking medications as prescribed? yes     Who is going to help you get home at discharge? Luis Pillai/significant other [phone]     How do you get to doctors appointments? family or friend will provide     Are you on dialysis? No     Do you take coumadin? No     DME Needed Upon Discharge  none     Discharge Plan discussed with: Patient;Spouse/sig other     Name(s) and Number(s) Luis Pillai/significant other [phone]     Transition of Care Barriers Other (see comments)   Memory impairment    Discharge Plan A Home with family     Discharge Plan B Home

## 2023-11-13 NOTE — HPI
Ms. Au is a 47 yo w/pmhx of dystonia, epilepsy, HTN presenting w/SOB and LE edema. Reports SOB started about 4 days ago and progrssively worsened. Boyfriend notes she wakes up in the middle of the night gasping for air. She feels like she has mucus in her throat that prevents her from sleeping/causes her to choke. Coughing up white/clear phlegm but was coughing green phlegm last week. Reports wheezing. Having pleuritic chest pain. Reports smoking 3 cigarretes a day currently but has previously smoken up to a pack a day and has smoked for at least the last 20 years. Also notes some orthopnea. Reports also having left foot infection for the last 7 months and follows w/Dr. Villa. Unable to put weight on her left foot due to the pain. Has taken multiple abx in the past but denies her foot ever improving. She was just recently in [facility] for her left foot. She was prescirbed a zpack and antiviral for shingles. Reports she has completed her zpack and had 1 more day of the antiviral. Last took PO steroids in July. Was also using topical steroid for foot.    Labs notable for WBC 18, hgb 12, K 4.6, cr 0.7, trop 4.4, BNP 51, COVID/flu negative. UA negative. CTPE negative for consolidation or PE. EKG NSR.     Patient given IV lasix 40mg IV x1 in the ED and vanc.

## 2023-11-13 NOTE — PHARMACY MED REC
"        Admission Medication History     The home medication history was taken by Enriqueta Erazo.    You may go to "Admission" then "Reconcile Home Medications" tabs to review and/or act upon these items.     The home medication list has been updated by the Pharmacy department.   Please read ALL comments highlighted in yellow.   Please address this information as you see fit.    Feel free to contact us if you have any questions or require assistance.        Medications listed below were obtained from: Patient/family and Analytic software- ZEFR  No current facility-administered medications on file prior to encounter.     Current Outpatient Medications on File Prior to Encounter   Medication Sig Dispense Refill    acyclovir (ZOVIRAX) 800 MG Tab Take 800 mg by mouth 5 (five) times daily.      amLODIPine (NORVASC) 10 MG tablet Take 10 mg by mouth once daily.      EScitalopram oxalate (LEXAPRO) 5 MG Tab Take 10 mg by mouth nightly.      gabapentin (NEURONTIN) 300 MG capsule Take 300 mg by mouth 2 (two) times daily.      lacosamide (VIMPAT) 50 mg Tab Take 50 mg by mouth every 12 (twelve) hours.      [DISCONTINUED] acyclovir (ZOVIRAX) 800 MG Tab Take 200 mg by mouth 5 (five) times daily.      acetaminophen (TYLENOL) 325 mg Cap          Enriqueta Erazo  [id number]          .          "

## 2023-11-13 NOTE — H&P
[facility] - Emergency Dept  Hospital Medicine  History & Physical    Patient Name: Eliezer uA  MRN: [id number]  Patient Class: IP- Inpatient  Admission Date: 11/12/2023  Attending Physician: Alexsander Olmedo MD   Primary Care Provider: Naif Castro MD         Patient information was obtained from patient, spouse/SO and ER records.     Subjective:     Principal Problem:SOB (shortness of breath)    Chief Complaint:   Chief Complaint   Patient presents with    Leg Swelling     C/o LLE swelling x 7 months and RLE swelling x 4 day. Patient is wheel chair bound.         HPI: Ms. Au is a 49 yo w/pmhx of dystonia, epilepsy, HTN presenting w/SOB and LE edema. Reports SOB started about 4 days ago and progrssively worsened. Boyfriend notes she wakes up in the middle of the night gasping for air. She feels like she has mucus in her throat that prevents her from sleeping/causes her to choke. Coughing up white/clear phlegm but was coughing green phlegm last week. Reports wheezing. Having pleuritic chest pain. Reports smoking 3 cigarretes a day currently but has previously smoken up to a pack a day and has smoked for at least the last 20 years. Also notes some orthopnea. Reports also having left foot infection for the last 7 months and follows w/Dr. Villa. Unable to put weight on her left foot due to the pain. Has taken multiple abx in the past but denies her foot ever improving. She was just recently in [facility] for her left foot. She was prescirbed a zpack and antiviral for shingles. Reports she has completed her zpack and had 1 more day of the antiviral. Last took PO steroids in July. Was also using topical steroid for foot.    Labs notable for WBC 18, hgb 12, K 4.6, cr 0.7, trop 4.4, BNP 51, COVID/flu negative. UA negative. CTPE negative for consolidation or PE. EKG NSR.     Patient given IV lasix 40mg IV x1 in the ED and vanc.       No past medical history on file.    Past Surgical  History:   Procedure Laterality Date    HYSTERECTOMY      partical       Review of patient's allergies indicates:   Allergen Reactions    Iodine Swelling    Tramadol Anxiety, Diarrhea, Edema, Hallucinations, Other (See Comments), Palpitations, Shortness Of Breath and Swelling    Mobic [meloxicam]     Pcn [penicillins]     Sulfa (sulfonamide antibiotics)     Norco [hydrocodone-acetaminophen] Itching and Rash       No current facility-administered medications on file prior to encounter.     Current Outpatient Medications on File Prior to Encounter   Medication Sig    acetaminophen (TYLENOL) 325 mg Cap     amLODIPine (NORVASC) 5 MG tablet Take 5 mg by mouth.    EScitalopram oxalate (LEXAPRO) 5 MG Tab Take 5 mg by mouth.    lacosamide (VIMPAT) 50 mg Tab Take by mouth.     Family History    None       Tobacco Use    Smoking status: Not on file    Smokeless tobacco: Not on file   Substance and Sexual Activity    Alcohol use: Not on file    Drug use: Not on file    Sexual activity: Not on file     Review of Systems   Respiratory:  Positive for cough, shortness of breath and wheezing.    Cardiovascular:  Negative for chest pain.   Gastrointestinal:  Negative for abdominal pain and diarrhea.   Genitourinary:  Negative for dysuria.   Skin:  Positive for wound.     Objective:     Vital Signs (Most Recent):  Temp: 98.8 °F (37.1 °C) (11/12/23 2029)  Pulse: 93 (11/13/23 0109)  Resp: (!) 22 (11/13/23 0109)  BP: (!) 161/82 (11/13/23 0001)  SpO2: 96 % (11/13/23 0109) Vital Signs (24h Range):  Temp:  [98.8 °F (37.1 °C)] 98.8 °F (37.1 °C)  Pulse:  [] 93  Resp:  [15-29] 22  SpO2:  [96 %-98 %] 96 %  BP: (143-161)/(68-91) 161/82     Weight: 108.9 kg (240 lb)  Body mass index is 39.94 kg/m².     Physical Exam  Constitutional:       Appearance: She is obese.   HENT:      Head: Normocephalic and atraumatic.      Right Ear: External ear normal.      Left Ear: External ear normal.      Nose: Nose normal.      Mouth/Throat:      Mouth:  Mucous membranes are moist.   Eyes:      Extraocular Movements: Extraocular movements intact.   Cardiovascular:      Rate and Rhythm: Normal rate and regular rhythm.      Heart sounds: No murmur heard.  Pulmonary:      Effort: Pulmonary effort is normal.      Comments: No crackles or wheezing appreciated   Good air flow  Abdominal:      General: Abdomen is flat. Bowel sounds are normal. There is no distension.   Musculoskeletal:      Right lower leg: Edema present.      Left lower leg: Edema present.   Skin:     Comments: Plantar surface of left foot w/sloughing of skin and erythema underneath, no purulent drainage noted   Neurological:      General: No focal deficit present.      Mental Status: She is alert. Mental status is at baseline.   Psychiatric:         Mood and Affect: Mood normal.         Behavior: Behavior normal.                Significant Labs: All pertinent labs within the past 24 hours have been reviewed.    Significant Imaging: I have reviewed all pertinent imaging results/findings within the past 24 hours.    Assessment/Plan:     * SOB (shortness of breath)  DDx includes undiagnosed CHF 2/2 PND, LE edema, orthopnea but BNP normal, no fluid on cxr, and no crackles on exam; COPD exacerbation 2/2 extensive smoking hx but no wheezing on exam. CTPE negative for consolidation or PE. On RA  -TTE in AM to better evaluate for CHF   -duonebs   -avoid steroids for now in setting of acute infection   -levoquin for possible COPD exacerbation  -s/p IV lasix 40mg in ED  -hold off on further diuresis until TTE results        Shingles  Dx at [facility]. On left foot.   -complete 2 more days of acyclovir       HTN (hypertension)  -amlodipine    Epilepsy  -vimpat      Lower extremity edema  -DVT US ordered   -s/p IV lasix       Left foot infection  WBC 18. Completed zpack today. Has been ongoing for last 7 months. Follows w/Dr. Villa/podiatry outpatient. Per prior podiatry notes was dx as dermatitis by  dermatology. MRI was ordered in clinic but not completed.   -levoquin   -MRSA nares   -blood culture (collected after abx)  -xray foot   -if xray negative, consider MRI  -prn pain meds   -check lactate       VTE Risk Mitigation (From admission, onward)           Ordered     enoxaparin injection 40 mg  Daily         11/13/23 0137     IP VTE HIGH RISK PATIENT  Once         11/13/23 0137     Place sequential compression device  Until discontinued         11/13/23 0137                                   Vincent Owen MD  Department of Hospital Medicine  [facility] - Emergency Dept    Instructed patient to receive vaccine - patient DECLINED and N/A  Family history non-contributory to current problem   Pertinent information:

## 2023-11-13 NOTE — ASSESSMENT & PLAN NOTE
WBC 18. Completed zpack today. Has been ongoing for last 7 months. Follows w/Dr. Villa/podiatry outpatient. Per prior podiatry notes was dx as dermatitis by dermatology. MRI was ordered in clinic but not completed.   -levoquin   -MRSA nares   -blood culture (collected after abx)  -xray foot   -if xray negative, consider MRI  -prn pain meds   -check lactate

## 2023-11-13 NOTE — NURSING
Nurses Note -- 4 Eyes      11/13/2023   3:25 PM      Skin assessed during: Admit      [] No Altered Skin Integrity Present    []Prevention Measures Documented      [x] Yes- Altered Skin Integrity Present or Discovered   [] LDA Added if Not in Epic (Describe Wound)   [x] New Altered Skin Integrity was Present on Admit and Documented in LDA   [x] Wound Image Taken  Patient not agreeable to wound to left groin.. moisture associated breakdown.     Wound Care Consulted? Yes    Attending Nurse:  Danilo Llamas RN/Staff Member:   MARLEN Jerome

## 2023-11-13 NOTE — ASSESSMENT & PLAN NOTE
WBC 18. Completed zpack today. Has been ongoing for last 7 months. Follows w/Dr. Villa/podiatry outpatient.  -levoquin   -MRSA nares   -blood culture (collected after abx)  -xray foot   -prn pain meds   -check lactate

## 2023-11-14 VITALS
WEIGHT: 244 LBS | SYSTOLIC BLOOD PRESSURE: 148 MMHG | HEART RATE: 85 BPM | BODY MASS INDEX: 40.65 KG/M2 | TEMPERATURE: 98 F | HEIGHT: 65 IN | OXYGEN SATURATION: 97 % | DIASTOLIC BLOOD PRESSURE: 78 MMHG | RESPIRATION RATE: 18 BRPM

## 2023-11-14 LAB
ANION GAP SERPL CALC-SCNC: 5 MMOL/L (ref 8–16)
BASOPHILS # BLD AUTO: 0.08 K/UL (ref 0–0.2)
BASOPHILS NFR BLD: 0.6 % (ref 0–1.9)
BUN SERPL-MCNC: 18 MG/DL (ref 6–20)
CALCIUM SERPL-MCNC: 8.9 MG/DL (ref 8.7–10.5)
CHLORIDE SERPL-SCNC: 101 MMOL/L (ref 95–110)
CO2 SERPL-SCNC: 31 MMOL/L (ref 23–29)
CREAT SERPL-MCNC: 0.8 MG/DL (ref 0.5–1.4)
DIFFERENTIAL METHOD: ABNORMAL
EOSINOPHIL # BLD AUTO: 0.4 K/UL (ref 0–0.5)
EOSINOPHIL NFR BLD: 3 % (ref 0–8)
ERYTHROCYTE [DISTWIDTH] IN BLOOD BY AUTOMATED COUNT: 13.8 % (ref 11.5–14.5)
EST. GFR  (NO RACE VARIABLE): >60 ML/MIN/1.73 M^2
GLUCOSE SERPL-MCNC: 105 MG/DL (ref 70–110)
HCT VFR BLD AUTO: 37.1 % (ref 37–48.5)
HGB BLD-MCNC: 11.5 G/DL (ref 12–16)
IMM GRANULOCYTES # BLD AUTO: 0.1 K/UL (ref 0–0.04)
IMM GRANULOCYTES NFR BLD AUTO: 0.8 % (ref 0–0.5)
LYMPHOCYTES # BLD AUTO: 3.9 K/UL (ref 1–4.8)
LYMPHOCYTES NFR BLD: 30.5 % (ref 18–48)
MCH RBC QN AUTO: 29.1 PG (ref 27–31)
MCHC RBC AUTO-ENTMCNC: 31 G/DL (ref 32–36)
MCV RBC AUTO: 94 FL (ref 82–98)
MONOCYTES # BLD AUTO: 1 K/UL (ref 0.3–1)
MONOCYTES NFR BLD: 7.6 % (ref 4–15)
NEUTROPHILS # BLD AUTO: 7.4 K/UL (ref 1.8–7.7)
NEUTROPHILS NFR BLD: 57.5 % (ref 38–73)
NRBC BLD-RTO: 0 /100 WBC
PLATELET # BLD AUTO: 272 K/UL (ref 150–450)
PMV BLD AUTO: 10.6 FL (ref 9.2–12.9)
POTASSIUM SERPL-SCNC: 4.2 MMOL/L (ref 3.5–5.1)
RBC # BLD AUTO: 3.95 M/UL (ref 4–5.4)
SODIUM SERPL-SCNC: 137 MMOL/L (ref 136–145)
WBC # BLD AUTO: 12.87 K/UL (ref 3.9–12.7)

## 2023-11-14 PROCEDURE — 63600175 PHARM REV CODE 636 W HCPCS: Performed by: STUDENT IN AN ORGANIZED HEALTH CARE EDUCATION/TRAINING PROGRAM

## 2023-11-14 PROCEDURE — 25000003 PHARM REV CODE 250: Performed by: STUDENT IN AN ORGANIZED HEALTH CARE EDUCATION/TRAINING PROGRAM

## 2023-11-14 PROCEDURE — 97161 PT EVAL LOW COMPLEX 20 MIN: CPT

## 2023-11-14 PROCEDURE — 25000003 PHARM REV CODE 250: Performed by: INTERNAL MEDICINE

## 2023-11-14 PROCEDURE — 94640 AIRWAY INHALATION TREATMENT: CPT

## 2023-11-14 PROCEDURE — 94761 N-INVAS EAR/PLS OXIMETRY MLT: CPT

## 2023-11-14 PROCEDURE — G0378 HOSPITAL OBSERVATION PER HR: HCPCS

## 2023-11-14 PROCEDURE — 99221 1ST HOSP IP/OBS SF/LOW 40: CPT | Mod: ,,, | Performed by: FAMILY MEDICINE

## 2023-11-14 PROCEDURE — 36415 COLL VENOUS BLD VENIPUNCTURE: CPT | Performed by: STUDENT IN AN ORGANIZED HEALTH CARE EDUCATION/TRAINING PROGRAM

## 2023-11-14 PROCEDURE — 99900031 HC PATIENT EDUCATION (STAT)

## 2023-11-14 PROCEDURE — 80048 BASIC METABOLIC PNL TOTAL CA: CPT | Performed by: STUDENT IN AN ORGANIZED HEALTH CARE EDUCATION/TRAINING PROGRAM

## 2023-11-14 PROCEDURE — 25000242 PHARM REV CODE 250 ALT 637 W/ HCPCS: Performed by: STUDENT IN AN ORGANIZED HEALTH CARE EDUCATION/TRAINING PROGRAM

## 2023-11-14 PROCEDURE — 99221 PR INITIAL HOSPITAL CARE,LEVL I: ICD-10-PCS | Mod: ,,, | Performed by: FAMILY MEDICINE

## 2023-11-14 PROCEDURE — 96366 THER/PROPH/DIAG IV INF ADDON: CPT

## 2023-11-14 PROCEDURE — 85025 COMPLETE CBC W/AUTO DIFF WBC: CPT | Performed by: STUDENT IN AN ORGANIZED HEALTH CARE EDUCATION/TRAINING PROGRAM

## 2023-11-14 PROCEDURE — S4991 NICOTINE PATCH NONLEGEND: HCPCS | Performed by: INTERNAL MEDICINE

## 2023-11-14 PROCEDURE — 25000003 PHARM REV CODE 250: Performed by: EMERGENCY MEDICINE

## 2023-11-14 RX ORDER — AZITHROMYCIN 500 MG/1
500 TABLET, FILM COATED ORAL DAILY
Qty: 7 TABLET | Refills: 0 | Status: SHIPPED | OUTPATIENT
Start: 2023-11-14 | End: 2024-02-01

## 2023-11-14 RX ORDER — BUTALBITAL, ACETAMINOPHEN AND CAFFEINE 50; 325; 40 MG/1; MG/1; MG/1
1 TABLET ORAL EVERY 4 HOURS PRN
Status: DISCONTINUED | OUTPATIENT
Start: 2023-11-14 | End: 2023-11-14 | Stop reason: HOSPADM

## 2023-11-14 RX ORDER — ACETAMINOPHEN 500 MG
1000 TABLET ORAL ONCE
Status: COMPLETED | OUTPATIENT
Start: 2023-11-14 | End: 2023-11-14

## 2023-11-14 RX ORDER — IBUPROFEN 200 MG
1 TABLET ORAL DAILY
Qty: 14 PATCH | Refills: 0 | Status: SHIPPED | OUTPATIENT
Start: 2023-11-15 | End: 2024-02-01

## 2023-11-14 RX ADMIN — MUPIROCIN 1 G: 20 OINTMENT TOPICAL at 09:11

## 2023-11-14 RX ADMIN — ACETAMINOPHEN 650 MG: 325 TABLET ORAL at 09:11

## 2023-11-14 RX ADMIN — ACYCLOVIR 800 MG: 400 TABLET ORAL at 05:11

## 2023-11-14 RX ADMIN — ACYCLOVIR 800 MG: 400 TABLET ORAL at 02:11

## 2023-11-14 RX ADMIN — BUTALBITAL, ACETAMINOPHEN AND CAFFEINE 1 TABLET: 325; 50; 40 TABLET ORAL at 02:11

## 2023-11-14 RX ADMIN — ACETAMINOPHEN 1000 MG: 500 TABLET ORAL at 02:11

## 2023-11-14 RX ADMIN — BUTALBITAL, ACETAMINOPHEN AND CAFFEINE 1 TABLET: 325; 50; 40 TABLET ORAL at 04:11

## 2023-11-14 RX ADMIN — IPRATROPIUM BROMIDE 0.5 MG: 0.5 SOLUTION RESPIRATORY (INHALATION) at 02:11

## 2023-11-14 RX ADMIN — ALBUTEROL SULFATE 2.5 MG: 2.5 SOLUTION RESPIRATORY (INHALATION) at 02:11

## 2023-11-14 RX ADMIN — ACETAMINOPHEN 650 MG: 325 TABLET ORAL at 12:11

## 2023-11-14 RX ADMIN — ACYCLOVIR 800 MG: 400 TABLET ORAL at 09:11

## 2023-11-14 RX ADMIN — IPRATROPIUM BROMIDE 0.5 MG: 0.5 SOLUTION RESPIRATORY (INHALATION) at 12:11

## 2023-11-14 RX ADMIN — AMLODIPINE BESYLATE 10 MG: 5 TABLET ORAL at 09:11

## 2023-11-14 RX ADMIN — NICOTINE 1 PATCH: 14 PATCH, EXTENDED RELEASE TRANSDERMAL at 09:11

## 2023-11-14 RX ADMIN — ESCITALOPRAM OXALATE 10 MG: 10 TABLET ORAL at 09:11

## 2023-11-14 RX ADMIN — LACOSAMIDE 50 MG: 50 TABLET, FILM COATED ORAL at 09:11

## 2023-11-14 RX ADMIN — ALBUTEROL SULFATE 2.5 MG: 2.5 SOLUTION RESPIRATORY (INHALATION) at 07:11

## 2023-11-14 RX ADMIN — LEVOFLOXACIN 750 MG: 5 INJECTION, SOLUTION INTRAVENOUS at 01:11

## 2023-11-14 RX ADMIN — IPRATROPIUM BROMIDE 0.5 MG: 0.5 SOLUTION RESPIRATORY (INHALATION) at 07:11

## 2023-11-14 NOTE — HOSPITAL COURSE
Patient admitted to the hospital for shortness of breath with cough.  Patient received IV Lasix and IV antibiotic and neb treatment.  Her shortness of breath back to her baseline.  Echo ordered.  Echo showed normal EF and normal diastolic.  Patient will discharged home today and advised weight loss and stop smoking.  Given oral Zithromax and nicotine patch.                Summary Echo 11/14/23         Left Ventricle: The left ventricle is normal in size. Normal wall thickness. Normal wall motion. There is normal systolic function with a visually estimated ejection fraction of 65 - 70%. There is normal diastolic function.    Right Ventricle: Normal right ventricular cavity size. Wall thickness is normal. Right ventricle wall motion  is normal. Systolic function is normal.    IVC/SVC: Normal venous pressure at 3 mmHg.

## 2023-11-14 NOTE — CONSULTS
Left foot red dry peeling, between 4-5th toe and plantar foot.  No open wounds at this time. Has been using antifungal cream.

## 2023-11-14 NOTE — DISCHARGE INSTRUCTIONS
Advised stop smoking.  If her shortness breath got worse she needs to come to the hospital for further evaluation

## 2023-11-14 NOTE — PT/OT/SLP EVAL
"Physical Therapy Evaluation and Discharge Note    Patient Name:  Eliezer Au   MRN:  [id number]    Recommendations:     Discharge Recommendations: No Therapy Indicated  Discharge Equipment Recommendations: none   Barriers to discharge: None and patient has 24/7 assistance at home    Assessment:     Eliezer Au is a 48 y.o. female admitted with a medical diagnosis of SOB (shortness of breath). .  At this time, patient is functioning at their prior level of function and does not require further acute PT services.     Recent Surgery: * No surgery found *      Plan:     During this hospitalization, patient does not require further acute PT services.  Please re-consult if situation changes.      Subjective     Chief Complaint: L foot pain  Patient/Family Comments/goals: go home  Pain/Comfort:  Pain Rating 1:  (does not rate "Feels like a smal piece of glass")  Location - Side 1: Left  Location - Orientation 1:  (plantar)  Location 1: foot  Pain Addressed 1: Distraction, Other (see comments) (education)  Pain Rating Post-Intervention 1:  (no change)    Patients cultural, spiritual, Ep[employer]alian conflicts given the current situation: no    Living Environment:  Lives with sister and SO in [facility], [facility]  and [address] [facility]  home  Prior to admission, patients level of function was min A for all self care; limited household ambulation with walker x 7 months due to foot infection/pain.  Equipment used at home: bath bench, wheelchair, walker, standard (ramp, knee scooter).  DME owned (not currently used): none.  Upon discharge, patient will have assistance from family.    Objective:     Communicated with nurse prior to session.  Patient found HOB elevated with PureWick, peripheral IV upon PT entry to room.    General Precautions: Standard, fall    Orthopedic Precautions:N/A   Braces: N/A  Respiratory Status: Room air    Exams:  Decreased safety/insight  Cognitive Exam:  Patient is oriented to Person, Place, Time, and Situation  Gross " Motor Coordination:  WFL  RUE Strength: WFL  LUE Strength: WFL  RLE Strength: WFL  LLE Strength: WFL    Functional Mobility:  Bed Mobility:     Supine to Sit: modified independence  Sit to Supine: modified independence  Transfers:     Sit to Stand:  supervision with rolling walker  Gait: amb 20 ft w RW WB on L heel to off load foot supervision    AM-PAC 6 CLICK MOBILITY  Total Score:22       Treatment and Education:  Pt and SO educated in PT roles and goals, fall prevention, safety with walker for transfers and gait    AM-PAC 6 CLICK MOBILITY  Total Score:22     Patient left HOB elevated with all lines intact, call button in reach, and SO present.    GOALS:   Multidisciplinary Problems       Physical Therapy Goals       Not on file                    History:     No past medical history on file.    Past Surgical History:   Procedure Laterality Date    HYSTERECTOMY      partical       Time Tracking:     PT Received On: 11/14/23  PT Start Time: 1005     PT Stop Time: 1016  PT Total Time (min): 11 min     Billable Minutes: Evaluation 11      11/14/2023

## 2023-11-14 NOTE — CARE UPDATE
11/13/23 1936   Patient Assessment/Suction   Level of Consciousness (AVPU) alert   Respiratory Effort Normal;Unlabored   All Lung Fields Breath Sounds diminished   PRE-TX-O2   Device (Oxygen Therapy) room air   SpO2 100 %   $ Pulse Oximetry - Multiple Charge Pulse Oximetry - Multiple   Pulse 82   Resp 17   Aerosol Therapy   $ Aerosol Therapy Charges Aerosol Treatment   Daily Review of Necessity (SVN) completed   Respiratory Treatment Status (SVN) given   Treatment Route (SVN) mask   Patient Position (SVN) HOB elevated   Post Treatment Assessment (SVN) breath sounds unchanged   Signs of Intolerance (SVN) none   Breath Sounds Post-Respiratory Treatment   Throughout All Fields Post-Treatment All Fields   Throughout All Fields Post-Treatment no change   Post-treatment Heart Rate (beats/min) 78   Post-treatment Resp Rate (breaths/min) 18   Education   $ Education Bronchodilator;15 min

## 2023-11-14 NOTE — PROGRESS NOTES
[facility] Medicine  Progress Note    Patient Name: Eliezer Au  MRN: [id number]  Patient Class: IP- Inpatient   Admission Date: 11/12/2023  Length of Stay: 1 days  Attending Physician: Giacomo Hurtado MD  Primary Care Provider: Naif Castro MD        Subjective:     Principal Problem:SOB (shortness of breath)        HPI:  Ms. Au is a 47 yo w/pmhx of dystonia, epilepsy, HTN presenting w/SOB and LE edema. Reports SOB started about 4 days ago and progrssively worsened. Boyfriend notes she wakes up in the middle of the night gasping for air. She feels like she has mucus in her throat that prevents her from sleeping/causes her to choke. Coughing up white/clear phlegm but was coughing green phlegm last week. Reports wheezing. Having pleuritic chest pain. Reports smoking 3 cigarretes a day currently but has previously smoken up to a pack a day and has smoked for at least the last 20 years. Also notes some orthopnea. Reports also having left foot infection for the last 7 months and follows w/Dr. Villa. Unable to put weight on her left foot due to the pain. Has taken multiple abx in the past but denies her foot ever improving. She was just recently in [facility] for her left foot. She was prescirbed a zpack and antiviral for shingles. Reports she has completed her zpack and had 1 more day of the antiviral. Last took PO steroids in July. Was also using topical steroid for foot.    Labs notable for WBC 18, hgb 12, K 4.6, cr 0.7, trop 4.4, BNP 51, COVID/flu negative. UA negative. CTPE negative for consolidation or PE. EKG NSR.     Patient given IV lasix 40mg IV x1 in the ED and vanc.     Overview/Hospital Course:  November 14, 2023  Shortness for breath improving significantly.  Still have some cough.  Patient denied chest pain, fever, chill                    Summary Echo 11/14/23         Left Ventricle: The left ventricle is normal in size. Normal wall thickness. Normal wall  motion. There is normal systolic function with a visually estimated ejection fraction of 65 - 70%. There is normal diastolic function.    Right Ventricle: Normal right ventricular cavity size. Wall thickness is normal. Right ventricle wall motion  is normal. Systolic function is normal.    IVC/SVC: Normal venous pressure at 3 mmHg.          Objective:     Vital Signs (Most Recent):  Temp: 97.8 °F (36.6 °C) (11/14/23 0408)  Pulse: 87 (11/14/23 0725)  Resp: 18 (11/14/23 0725)  BP: (!) 117/57 (11/14/23 0408)  SpO2: 97 % (11/14/23 0725) Vital Signs (24h Range):  Temp:  [97.5 °F (36.4 °C)-97.9 °F (36.6 °C)] 97.8 °F (36.6 °C)  Pulse:  [75-98] 87  Resp:  [16-24] 18  SpO2:  [91 %-100 %] 97 %  BP: (117-148)/(57-95) 117/57     Weight: 110.7 kg (244 lb)  Body mass index is 40.6 kg/m².    Intake/Output Summary (Last 24 hours) at 11/14/2023 0744  Last data filed at 11/13/2023 1649  Gross per 24 hour   Intake --   Output 750 ml   Net -750 ml         Physical Exam  Constitutional:       General: She is not in acute distress.  HENT:      Head: Normocephalic and atraumatic.      Nose: No congestion.   Eyes:      General: No scleral icterus.        Right eye: No discharge.         Left eye: No discharge.      Extraocular Movements: Extraocular movements intact.   Cardiovascular:      Rate and Rhythm: Normal rate and regular rhythm.   Pulmonary:      Effort: Pulmonary effort is normal.      Breath sounds: Normal breath sounds.   Abdominal:      General: Abdomen is flat. Bowel sounds are normal.   Musculoskeletal:         General: No swelling or tenderness.      Cervical back: Normal range of motion and neck supple. No rigidity.   Skin:     General: Skin is warm.   Neurological:      General: No focal deficit present.      Mental Status: She is alert and oriented to person, place, and time.   Psychiatric:         Mood and Affect: Mood normal.             Significant Labs: All pertinent labs within the past 24 hours have been  reviewed.  CBC:   Recent Labs   Lab 11/12/23 2045 11/13/23  0230 11/14/23  0542   WBC 18.65* 19.92* 12.87*   HGB 12.7 12.3 11.5*   HCT 39.6 38.5 37.1    330 272     CMP:   Recent Labs   Lab 11/12/23 2045 11/13/23 0229 11/14/23  0542    139 137   K 4.6 4.5 4.2    99 101   CO2 29 34* 31*    107 105   BUN 17 18 18   CREATININE 0.7 1.1 0.8   CALCIUM 9.3 9.2 8.9   PROT 7.7  --   --    ALBUMIN 4.3  --   --    BILITOT 0.3  --   --    ALKPHOS 95  --   --    AST 23  --   --    ALT 40  --   --    ANIONGAP 6* 6* 5*     Cardiac Markers:   Recent Labs   Lab 11/12/23 2045   BNP 51       Significant Imaging: I have reviewed all pertinent imaging results/findings within the past 24 hours.    Assessment/Plan:      * SOB (shortness of breath) almost back to her baseline.  Advised stop smoking  Summary         Left Ventricle: The left ventricle is normal in size. Normal wall thickness. Normal wall motion. There is normal systolic function with a visually estimated ejection fraction of 65 - 70%. There is normal diastolic function.    Right Ventricle: Normal right ventricular cavity size. Wall thickness is normal. Right ventricle wall motion  is normal. Systolic function is normal.    IVC/SVC: Normal venous pressure at 3 mmHg.       Shingles  Dx at [facility]. On left foot.   -complete 2 more days of acyclovir       HTN (hypertension)  -amlodipine    Epilepsy  -vimpat      Lower extremity edema  -DVT US ordered   -s/p IV lasix       Left foot infection  WBC 18. Completed zpack today. Has been ongoing for last 7 months. Follows w/Dr. Villa/podiatry outpatient. Per prior podiatry notes was dx as dermatitis by dermatology. MRI was ordered in clinic but not completed.   -levoquin   -MRSA nares   -blood culture (collected after abx)  -xray foot   -if xray negative, consider MRI  -prn pain meds   -check lactate       VTE Risk Mitigation (From admission, onward)           Ordered     enoxaparin injection  40 mg  Daily         11/13/23 0137     IP VTE HIGH RISK PATIENT  Once         11/13/23 0137     Place sequential compression device  Until discontinued         11/13/23 0137                    Discharge Planning   IRIS: 11/15/2023     Code Status: Full Code   Is the patient medically ready for discharge?:     Reason for patient still in hospital (select all that apply): Treatment  Discharge Plan A: Home with family                  Giacomo Hurtado MD  Department of Hospital Medicine   [facility]

## 2023-11-14 NOTE — DISCHARGE SUMMARY
[facility] Medicine  Discharge Summary      Patient Name: Eliezer Au  MRN: [id number]  [facility]: [id number]  Patient Class: IP- Inpatient  Admission Date: 11/12/2023  Hospital Length of Stay: 1 days  Discharge Date and Time:  11/14/2023 12:43 PM  Attending Physician: Giacomo Hurtado MD   Discharging Provider: Giacomo Hurtado MD  Primary Care Provider: Naif Castro MD    Primary Care Team: Networked reference to record PCT     HPI:   Ms. Au is a 49 yo w/pmhx of dystonia, epilepsy, HTN presenting w/SOB and LE edema. Reports SOB started about 4 days ago and progrssively worsened. Boyfriend notes she wakes up in the middle of the night gasping for air. She feels like she has mucus in her throat that prevents her from sleeping/causes her to choke. Coughing up white/clear phlegm but was coughing green phlegm last week. Reports wheezing. Having pleuritic chest pain. Reports smoking 3 cigarretes a day currently but has previously smoken up to a pack a day and has smoked for at least the last 20 years. Also notes some orthopnea. Reports also having left foot infection for the last 7 months and follows w/Dr. Villa. Unable to put weight on her left foot due to the pain. Has taken multiple abx in the past but denies her foot ever improving. She was just recently in [facility] for her left foot. She was prescirbed a zpack and antiviral for shingles. Reports she has completed her zpack and had 1 more day of the antiviral. Last took PO steroids in July. Was also using topical steroid for foot.    Labs notable for WBC 18, hgb 12, K 4.6, cr 0.7, trop 4.4, BNP 51, COVID/flu negative. UA negative. CTPE negative for consolidation or PE. EKG NSR.     Patient given IV lasix 40mg IV x1 in the ED and vanc.     * No surgery found *      Hospital Course:   Patient admitted to the hospital for shortness of breath with cough.  Patient received IV Lasix and IV antibiotic and neb treatment.  Her  shortness of breath back to her baseline.  Echo ordered.  Echo showed normal EF and normal diastolic.  Patient will discharged home today and advised weight loss and stop smoking.  Given oral Zithromax and nicotine patch.        Physical Exam  Constitutional:       General: She is not in acute distress.  HENT:      Head: Normocephalic and atraumatic.      Nose: No congestion.   Eyes:      General: No scleral icterus.        Right eye: No discharge.         Left eye: No discharge.      Extraocular Movements: Extraocular movements intact.   Cardiovascular:      Rate and Rhythm: Normal rate and regular rhythm.   Pulmonary:      Effort: Pulmonary effort is normal.      Breath sounds: Normal breath sounds.   Abdominal:      General: Abdomen is flat. Bowel sounds are normal.   Musculoskeletal:         General: No swelling or tenderness.      Cervical back: Normal range of motion and neck supple. No rigidity.   Skin:     General: Skin is warm.   Neurological:      General: No focal deficit present.      Mental Status: She is alert and oriented to person, place, and time.   Psychiatric:         Mood and Affect: Mood normal.              Summary Echo 11/14/23         Left Ventricle: The left ventricle is normal in size. Normal wall thickness. Normal wall motion. There is normal systolic function with a visually estimated ejection fraction of 65 - 70%. There is normal diastolic function.    Right Ventricle: Normal right ventricular cavity size. Wall thickness is normal. Right ventricle wall motion  is normal. Systolic function is normal.    IVC/SVC: Normal venous pressure at 3 mmHg.     Goals of Care Treatment Preferences:  Code Status: Full Code      Consults:     No new Assessment & Plan notes have been filed under this hospital service since the last note was generated.  Service: Hospital Medicine    Final Active Diagnoses:    Diagnosis Date Noted POA    PRINCIPAL PROBLEM:  SOB (shortness of breath) [R06.02] 11/13/2023 Yes     Left foot infection [L08.9] 11/13/2023 Yes    Lower extremity edema [R60.0] 11/13/2023 Yes    Epilepsy [G40.909] 11/13/2023 Yes    HTN (hypertension) [I10] 11/13/2023 Yes    Shingles [B02.9] 11/13/2023 Yes      Problems Resolved During this Admission:       Discharged Condition: good    Disposition:     Follow Up:   Follow-up Information       Naif Castro MD Follow up in 3 day(s).    Specialty: Internal Medicine  Contact information:  [address] [place] [address] [phone]                           Patient Instructions:   No discharge procedures on file.    Significant Diagnostic Studies: Labs: CMP   Recent Labs   Lab 11/12/23 2045 11/13/23 0229 11/14/23  0542    139 137   K 4.6 4.5 4.2    99 101   CO2 29 34* 31*    107 105   BUN 17 18 18   CREATININE 0.7 1.1 0.8   CALCIUM 9.3 9.2 8.9   PROT 7.7  --   --    ALBUMIN 4.3  --   --    BILITOT 0.3  --   --    ALKPHOS 95  --   --    AST 23  --   --    ALT 40  --   --    ANIONGAP 6* 6* 5*    and CBC   Recent Labs   Lab 11/12/23 2045 11/13/23 0230 11/14/23  0542   WBC 18.65* 19.92* 12.87*   HGB 12.7 12.3 11.5*   HCT 39.6 38.5 37.1    330 272       Pending Diagnostic Studies:       None           Medications:  Reconciled Home Medications:      Medication List        START taking these medications      azithromycin 500 MG tablet  Commonly known as: ZITHROMAX  Take 1 tablet (500 mg total) by mouth once daily.     nicotine 14 mg/24 hr  Commonly known as: NICODERM CQ  Place 1 patch onto the skin once daily.  Start taking on: November 15, 2023            CONTINUE taking these medications      acyclovir 800 MG Tab  Commonly known as: ZOVIRAX  Take 800 mg by mouth 5 (five) times daily.     amLODIPine 10 MG tablet  Commonly known as: NORVASC  Take 10 mg by mouth once daily.     EScitalopram oxalate 5 MG Tab  Commonly known as: LEXAPRO  Take 10 mg by mouth nightly.     gabapentin 300 MG capsule  Commonly known as: NEURONTIN  Take 300  mg by mouth 2 (two) times daily.     lacosamide 50 mg Tab  Commonly known as: VIMPAT  Take 50 mg by mouth every 12 (twelve) hours.     TylenoL 325 mg Cap  Generic drug: acetaminophen              Indwelling Lines/Drains at time of discharge:   Lines/Drains/Airways       None                   Time spent on the discharge of patient: 31 minutes         Giacomo Hurtado MD  Department of Hospital Medicine  [facility]

## 2023-11-14 NOTE — CARE UPDATE
11/14/23 0725   Patient Assessment/Suction   Level of Consciousness (AVPU) alert   Respiratory Effort Normal;Unlabored   Expansion/Accessory Muscles/Retractions no retractions;no use of accessory muscles   All Lung Fields Breath Sounds diminished;clear;equal bilaterally   Rhythm/Pattern, Respiratory no shortness of breath reported;unlabored;pattern regular   Cough Frequency no cough   PRE-TX-O2   Device (Oxygen Therapy) room air   SpO2 97 %   Pulse Oximetry Type Intermittent   $ Pulse Oximetry - Multiple Charge Pulse Oximetry - Multiple   Pulse 87   Resp 18   Aerosol Therapy   $ Aerosol Therapy Charges Aerosol Treatment   Daily Review of Necessity (SVN) completed   Respiratory Treatment Status (SVN) given   Treatment Route (SVN) mask;oxygen   Patient Position (SVN) sitting on edge of bed   Post Treatment Assessment (SVN) increased aeration   Signs of Intolerance (SVN) none   Breath Sounds Post-Respiratory Treatment   Throughout All Fields Post-Treatment All Fields   Throughout All Fields Post-Treatment aeration increased   Post-treatment Heart Rate (beats/min) 84   Post-treatment Resp Rate (breaths/min) 18   Education   $ Education Bronchodilator;15 min

## 2023-11-14 NOTE — SUBJECTIVE & OBJECTIVE
Objective:     Vital Signs (Most Recent):  Temp: 97.8 °F (36.6 °C) (11/14/23 0408)  Pulse: 87 (11/14/23 0725)  Resp: 18 (11/14/23 0725)  BP: (!) 117/57 (11/14/23 0408)  SpO2: 97 % (11/14/23 0725) Vital Signs (24h Range):  Temp:  [97.5 °F (36.4 °C)-97.9 °F (36.6 °C)] 97.8 °F (36.6 °C)  Pulse:  [75-98] 87  Resp:  [16-24] 18  SpO2:  [91 %-100 %] 97 %  BP: (117-148)/(57-95) 117/57     Weight: 110.7 kg (244 lb)  Body mass index is 40.6 kg/m².    Intake/Output Summary (Last 24 hours) at 11/14/2023 0744  Last data filed at 11/13/2023 1649  Gross per 24 hour   Intake --   Output 750 ml   Net -750 ml         Physical Exam  Constitutional:       General: She is not in acute distress.  HENT:      Head: Normocephalic and atraumatic.      Nose: No congestion.   Eyes:      General: No scleral icterus.        Right eye: No discharge.         Left eye: No discharge.      Extraocular Movements: Extraocular movements intact.   Cardiovascular:      Rate and Rhythm: Normal rate and regular rhythm.   Pulmonary:      Effort: Pulmonary effort is normal.      Breath sounds: Normal breath sounds.   Abdominal:      General: Abdomen is flat. Bowel sounds are normal.   Musculoskeletal:         General: No swelling or tenderness.      Cervical back: Normal range of motion and neck supple. No rigidity.   Skin:     General: Skin is warm.   Neurological:      General: No focal deficit present.      Mental Status: She is alert and oriented to person, place, and time.   Psychiatric:         Mood and Affect: Mood normal.             Significant Labs: All pertinent labs within the past 24 hours have been reviewed.  CBC:   Recent Labs   Lab 11/12/23 2045 11/13/23  0230 11/14/23  0542   WBC 18.65* 19.92* 12.87*   HGB 12.7 12.3 11.5*   HCT 39.6 38.5 37.1    330 272     CMP:   Recent Labs   Lab 11/12/23 2045 11/13/23  0229 11/14/23  0542    139 137   K 4.6 4.5 4.2    99 101   CO2 29 34* 31*    107 105   BUN 17 18 18    CREATININE 0.7 1.1 0.8   CALCIUM 9.3 9.2 8.9   PROT 7.7  --   --    ALBUMIN 4.3  --   --    BILITOT 0.3  --   --    ALKPHOS 95  --   --    AST 23  --   --    ALT 40  --   --    ANIONGAP 6* 6* 5*     Cardiac Markers:   Recent Labs   Lab 11/12/23  2045   BNP 51       Significant Imaging: I have reviewed all pertinent imaging results/findings within the past 24 hours.

## 2023-11-14 NOTE — PLAN OF CARE
Problem: Adult Inpatient Plan of Care  Goal: Plan of Care Review  Outcome: Met  Goal: Patient-Specific Goal (Individualized)  Outcome: Met  Goal: Absence of Hospital-Acquired Illness or Injury  Outcome: Met  Goal: Optimal Comfort and Wellbeing  Outcome: Met  Goal: Readiness for Transition of Care  Outcome: Met     Problem: Impaired Wound Healing  Goal: Optimal Wound Healing  Outcome: Met     Problem: Skin Injury Risk Increased  Goal: Skin Health and Integrity  Outcome: Met     Problem: Bariatric Environmental Safety  Goal: Safety Maintained with Care  Outcome: Met

## 2023-11-14 NOTE — CONSULTS
Chief complaint:  Leg Swelling (C/o LLE swelling x 7 months and RLE swelling x 4 day. Patient is wheel chair bound. )      HPI:  Eliezer Au is a 48 y.o. female presenting with a rash on the left foot. Pt has had the rash for over 7 months and it has developed peeling dry skin. Pt stated it is painful, and is not improving with various washes, topical lotions or ointments. Pt has no other complaints today.    PMH:  As per HPI and below:  No past medical history on file.    Social History     Socioeconomic History    Marital status: Legally        Past Surgical History:   Procedure Laterality Date    HYSTERECTOMY      partical       No family history on file.    Review of patient's allergies indicates:   Allergen Reactions    Iodine Swelling    Tramadol Anxiety, Diarrhea, Edema, Hallucinations, Other (See Comments), Palpitations, Shortness Of Breath and Swelling    Mobic [meloxicam]     Pcn [penicillins]     Sulfa (sulfonamide antibiotics)     Norco [hydrocodone-acetaminophen] Itching and Rash       No current facility-administered medications on file prior to encounter.     Current Outpatient Medications on File Prior to Encounter   Medication Sig Dispense Refill    acyclovir (ZOVIRAX) 800 MG Tab Take 800 mg by mouth 5 (five) times daily.      amLODIPine (NORVASC) 10 MG tablet Take 10 mg by mouth once daily.      EScitalopram oxalate (LEXAPRO) 5 MG Tab Take 10 mg by mouth nightly.      gabapentin (NEURONTIN) 300 MG capsule Take 300 mg by mouth 2 (two) times daily.      lacosamide (VIMPAT) 50 mg Tab Take 50 mg by mouth every 12 (twelve) hours.      acetaminophen (TYLENOL) 325 mg Cap          ROS: As per HPI and below:  Pertinent items are noted in HPI.      Physical Exam:     Vitals:    11/14/23 0725 11/14/23 0740 11/14/23 1154 11/14/23 1406   BP:  138/76 (!) 148/78    Pulse: 87 79 88 85   Resp: 18 18 18 18   Temp:  97.9 °F (36.6 °C) 98.1 °F (36.7 °C)    TempSrc:  Oral Oral    SpO2: 97% 95% 97% 97%  "  Weight:       Height:           BP  Min: 117/57  Max: 161/82  Temp  Av °F (36.7 °C)  Min: 97.5 °F (36.4 °C)  Max: 98.8 °F (37.1 °C)  Pulse  Av.5  Min: 75  Max: 103  Resp  Av.4  Min: 15  Max: 120  SpO2  Av.7 %  Min: 91 %  Max: 100 %  Height  Av' 5" (165.1 cm)  Min: 5' 5" (165.1 cm)  Max: 5' 5" (165.1 cm)  Weight  Av.5 kg (241 lb 5.8 oz)  Min: 108.9 kg (240 lb)  Max: 110.7 kg (244 lb)    Body mass index is 40.6 kg/m².          General:             Well developed, well nourished, no apparent distress  HEENT:              NCAT, no JVD, mucous membranes moist, EOM intact  Cardiovascular:  Regular rate and rhythm, normal S1, normal S2, No murmurs, rubs, or gallops  Respiratory:        Normal breath sounds, no wheezes, no rales, no rhonchi  Abdomen:           Bowel sounds present, non tender, non distended, no masses, no hepatojugular reflux  Extremities:        No clubbing, no cyanosis, no edema  Vascular:            2+ b/l radial.  Peripheral pulses intact.  No carotid bruits.  Neurological:      No focal deficits  Skin:                   No obvious rashes or erythema, skin rash on the left foot with dried skin, painful            Lab Results   Component Value Date    WBC 12.87 (H) 2023    HGB 11.5 (L) 2023    HCT 37.1 2023    MCV 94 2023     2023     No results found for: "CHOL"  No results found for: "HDL"  No results found for: "LDLCALC"  No results found for: "TRIG"  No results found for: "CHOLHDL"  CMP  Recent Labs   Lab 23  0542      CALCIUM 8.9      K 4.2   CO2 31*      BUN 18   CREATININE 0.8      Lab Results   Component Value Date    TSH 3.180 07/15/2023         Assessment and Recommendations       Diagnoses:    1. Left foot tinea pedis    Plan:  1. Recommended Tinactin spray powder daily for minimum of 30 days    Complexity:    low    "

## 2023-11-14 NOTE — PLAN OF CARE
DC orders and chart reviewed. No discharge needs noted.  Patient cleared for discharge from .  Patient is discharging home.  Follow up appointment information added to AVS.        11/14/23 1558   Final Note   Assessment Type Final Discharge Note   Anticipated Discharge Disposition Home   What phone number can be called within the next 1-3 days to see how you are doing after discharge? [phone]   Hospital Resources/Appts/Education Provided Appointments scheduled and added to AVS   Post-Acute Status   Discharge Delays None known at this time

## 2023-11-17 ENCOUNTER — HOSPITAL ENCOUNTER (OUTPATIENT)
Dept: RADIOLOGY | Facility: HOSPITAL | Age: 48
Discharge: HOME | End: 2023-11-17
Attending: PODIATRIST
Payer: MEDICAID

## 2023-11-17 DIAGNOSIS — M79.672 LEFT FOOT PAIN: ICD-10-CM

## 2023-11-17 PROCEDURE — 73720 MRI LWR EXTREMITY W/O&W/DYE: CPT | Mod: TC,LT

## 2023-11-17 RX ORDER — GADOBUTROL 604.72 MG/ML
10 INJECTION INTRAVENOUS
Status: DISCONTINUED | OUTPATIENT
Start: 2023-11-17 | End: 2023-11-18 | Stop reason: HOSPADM

## 2023-11-18 LAB
BACTERIA BLD CULT: NORMAL
BACTERIA BLD CULT: NORMAL

## 2023-12-22 ENCOUNTER — ON-DEMAND VIRTUAL (OUTPATIENT)
Dept: URGENT CARE | Facility: CLINIC | Age: 48
End: 2023-12-22
Payer: MEDICAID

## 2024-01-08 ENCOUNTER — TELEPHONE (OUTPATIENT)
Dept: SLEEP MEDICINE | Facility: CLINIC | Age: 49
End: 2024-01-08
Payer: COMMERCIAL

## 2024-01-08 NOTE — TELEPHONE ENCOUNTER
----- Message from Marielle Villalpando sent at 1/8/2024 10:56 AM CST -----  Type:  Appointment Request    Name of Caller:CARMEN MELGAR [[id number]]  When is the first available appointment?No access  Symptoms:Np referral   Would the patient rather a call back or a response via [facility]? Call back   Best Call Back Number:[phone]  Additional Information: Patient indicates she has an paper order to the department and would like to schedule for an appointment with the department. Patient indicates she will have her referral sent over from the providers office. Patient indicates she would like to schedule an appointment for the soonest appointment available as she was referred 12/8/23. Please call back with further assistance.

## 2024-02-01 ENCOUNTER — OFFICE VISIT (OUTPATIENT)
Dept: INTERNAL MEDICINE | Facility: CLINIC | Age: 49
End: 2024-02-01
Payer: COMMERCIAL

## 2024-02-01 ENCOUNTER — HOSPITAL ENCOUNTER (EMERGENCY)
Facility: HOSPITAL | Age: 49
Discharge: HOME | End: 2024-02-01
Attending: STUDENT IN AN ORGANIZED HEALTH CARE EDUCATION/TRAINING PROGRAM
Payer: MEDICAID

## 2024-02-01 ENCOUNTER — LAB VISIT (OUTPATIENT)
Dept: LAB | Facility: HOSPITAL | Age: 49
End: 2024-02-01
Attending: INTERNAL MEDICINE
Payer: COMMERCIAL

## 2024-02-01 VITALS
DIASTOLIC BLOOD PRESSURE: 80 MMHG | WEIGHT: 263.69 LBS | SYSTOLIC BLOOD PRESSURE: 112 MMHG | BODY MASS INDEX: 43.88 KG/M2

## 2024-02-01 VITALS
OXYGEN SATURATION: 95 % | TEMPERATURE: 99 F | SYSTOLIC BLOOD PRESSURE: 112 MMHG | HEART RATE: 79 BPM | HEIGHT: 65 IN | BODY MASS INDEX: 43.65 KG/M2 | WEIGHT: 262 LBS | DIASTOLIC BLOOD PRESSURE: 66 MMHG | RESPIRATION RATE: 19 BRPM

## 2024-02-01 DIAGNOSIS — R11.2 NAUSEA AND VOMITING, UNSPECIFIED VOMITING TYPE: ICD-10-CM

## 2024-02-01 DIAGNOSIS — R35.89 POLYURIA: ICD-10-CM

## 2024-02-01 DIAGNOSIS — Z00.00 ROUTINE GENERAL MEDICAL EXAMINATION AT A HEALTH CARE FACILITY: ICD-10-CM

## 2024-02-01 DIAGNOSIS — R46.89 COGNITIVE AND BEHAVIORAL CHANGES: ICD-10-CM

## 2024-02-01 DIAGNOSIS — Z12.11 COLON CANCER SCREENING: ICD-10-CM

## 2024-02-01 DIAGNOSIS — F19.11 HISTORY OF SUBSTANCE ABUSE: ICD-10-CM

## 2024-02-01 DIAGNOSIS — G40.909 NONINTRACTABLE EPILEPSY WITHOUT STATUS EPILEPTICUS, UNSPECIFIED EPILEPSY TYPE: Primary | ICD-10-CM

## 2024-02-01 DIAGNOSIS — R41.89 COGNITIVE AND BEHAVIORAL CHANGES: ICD-10-CM

## 2024-02-01 DIAGNOSIS — I10 HYPERTENSION, UNSPECIFIED TYPE: ICD-10-CM

## 2024-02-01 DIAGNOSIS — B34.9 VIRAL SYNDROME: Primary | ICD-10-CM

## 2024-02-01 DIAGNOSIS — R60.0 LOWER EXTREMITY EDEMA: ICD-10-CM

## 2024-02-01 LAB
AMPHET+METHAMPHET UR QL: NEGATIVE
BACTERIA #/AREA URNS HPF: NEGATIVE /HPF
BARBITURATES UR QL SCN>200 NG/ML: NEGATIVE
BASOPHILS # BLD AUTO: 0.04 K/UL (ref 0–0.2)
BASOPHILS NFR BLD: 0.7 % (ref 0–1.9)
BENZODIAZ UR QL SCN>200 NG/ML: NEGATIVE
BILIRUB UR QL STRIP: NEGATIVE
BZE UR QL SCN: NEGATIVE
CANNABINOIDS UR QL SCN: NEGATIVE
CLARITY UR: CLEAR
COLOR UR: YELLOW
CREAT UR-MCNC: 341 MG/DL (ref 15–325)
DIFFERENTIAL METHOD BLD: ABNORMAL
EOSINOPHIL # BLD AUTO: 0.1 K/UL (ref 0–0.5)
EOSINOPHIL NFR BLD: 0.9 % (ref 0–8)
ERYTHROCYTE [DISTWIDTH] IN BLOOD BY AUTOMATED COUNT: 13.6 % (ref 11.5–14.5)
ETHANOL UR-MCNC: <10 MG/DL
GLUCOSE UR QL STRIP: NEGATIVE
HCT VFR BLD AUTO: 40.3 % (ref 37–48.5)
HCV AB SERPL QL IA: NORMAL
HGB BLD-MCNC: 12.5 G/DL (ref 12–16)
HGB UR QL STRIP: ABNORMAL
HIV 1+2 AB+HIV1 P24 AG SERPL QL IA: NORMAL
HYALINE CASTS #/AREA URNS LPF: 9 /LPF
IMM GRANULOCYTES # BLD AUTO: 0.02 K/UL (ref 0–0.04)
IMM GRANULOCYTES NFR BLD AUTO: 0.4 % (ref 0–0.5)
KETONES UR QL STRIP: ABNORMAL
LEUKOCYTE ESTERASE UR QL STRIP: NEGATIVE
LIPASE SERPL-CCNC: 28 U/L (ref 4–60)
LYMPHOCYTES # BLD AUTO: 1.4 K/UL (ref 1–4.8)
LYMPHOCYTES NFR BLD: 26.2 % (ref 18–48)
MCH RBC QN AUTO: 27.9 PG (ref 27–31)
MCHC RBC AUTO-ENTMCNC: 31 G/DL (ref 32–36)
MCV RBC AUTO: 90 FL (ref 82–98)
METHADONE UR QL SCN>300 NG/ML: NEGATIVE
MICROSCOPIC COMMENT: ABNORMAL
MONOCYTES # BLD AUTO: 0.8 K/UL (ref 0.3–1)
MONOCYTES NFR BLD: 15.2 % (ref 4–15)
NEUTROPHILS # BLD AUTO: 3 K/UL (ref 1.8–7.7)
NEUTROPHILS NFR BLD: 56.6 % (ref 38–73)
NITRITE UR QL STRIP: NEGATIVE
NRBC BLD-RTO: 0 /100 WBC
OPIATES UR QL SCN: NEGATIVE
PCP UR QL SCN>25 NG/ML: NEGATIVE
PH UR STRIP: 6 [PH] (ref 5–8)
PLATELET # BLD AUTO: 190 K/UL (ref 150–450)
PMV BLD AUTO: 10.8 FL (ref 9.2–12.9)
PROT UR QL STRIP: ABNORMAL
RBC # BLD AUTO: 4.48 M/UL (ref 4–5.4)
RBC #/AREA URNS HPF: 18 /HPF (ref 0–4)
SARS-COV-2 RDRP RESP QL NAA+PROBE: NEGATIVE
SP GR UR STRIP: >1.03 (ref 1–1.03)
SQUAMOUS #/AREA URNS HPF: 5 /HPF
TOXICOLOGY INFORMATION: ABNORMAL
URN SPEC COLLECT METH UR: ABNORMAL
UROBILINOGEN UR STRIP-ACNC: ABNORMAL EU/DL
WBC # BLD AUTO: 5.34 K/UL (ref 3.9–12.7)
WBC #/AREA URNS HPF: 2 /HPF (ref 0–5)

## 2024-02-01 PROCEDURE — 86803 HEPATITIS C AB TEST: CPT | Performed by: INTERNAL MEDICINE

## 2024-02-01 PROCEDURE — 80326 AMPHETAMINES 5 OR MORE: CPT | Performed by: INTERNAL MEDICINE

## 2024-02-01 PROCEDURE — 99213 OFFICE O/P EST LOW 20 MIN: CPT | Mod: PBBFAC | Performed by: INTERNAL MEDICINE

## 2024-02-01 PROCEDURE — 87389 HIV-1 AG W/HIV-1&-2 AB AG IA: CPT | Performed by: INTERNAL MEDICINE

## 2024-02-01 PROCEDURE — 83690 ASSAY OF LIPASE: CPT | Performed by: NURSE PRACTITIONER

## 2024-02-01 PROCEDURE — 36415 COLL VENOUS BLD VENIPUNCTURE: CPT | Performed by: INTERNAL MEDICINE

## 2024-02-01 PROCEDURE — 3079F DIAST BP 80-89 MM HG: CPT | Mod: CPTII,S$GLB,, | Performed by: INTERNAL MEDICINE

## 2024-02-01 PROCEDURE — 99205 OFFICE O/P NEW HI 60 MIN: CPT | Mod: S$GLB,,, | Performed by: INTERNAL MEDICINE

## 2024-02-01 PROCEDURE — 3044F HG A1C LEVEL LT 7.0%: CPT | Mod: CPTII,S$GLB,, | Performed by: INTERNAL MEDICINE

## 2024-02-01 PROCEDURE — 25000003 PHARM REV CODE 250: Performed by: STUDENT IN AN ORGANIZED HEALTH CARE EDUCATION/TRAINING PROGRAM

## 2024-02-01 PROCEDURE — 80307 DRUG TEST PRSMV CHEM ANLYZR: CPT | Performed by: INTERNAL MEDICINE

## 2024-02-01 PROCEDURE — 85025 COMPLETE CBC W/AUTO DIFF WBC: CPT | Performed by: NURSE PRACTITIONER

## 2024-02-01 PROCEDURE — 83036 HEMOGLOBIN GLYCOSYLATED A1C: CPT | Performed by: INTERNAL MEDICINE

## 2024-02-01 PROCEDURE — 3074F SYST BP LT 130 MM HG: CPT | Mod: CPTII,S$GLB,, | Performed by: INTERNAL MEDICINE

## 2024-02-01 PROCEDURE — U0002 COVID-19 LAB TEST NON-CDC: HCPCS | Performed by: NURSE PRACTITIONER

## 2024-02-01 PROCEDURE — 99284 EMERGENCY DEPT VISIT MOD MDM: CPT

## 2024-02-01 PROCEDURE — 99999 PR PBB SHADOW E&M-EST. PATIENT-LVL III: CPT | Mod: PBBFAC,,, | Performed by: INTERNAL MEDICINE

## 2024-02-01 PROCEDURE — 3008F BODY MASS INDEX DOCD: CPT | Mod: CPTII,S$GLB,, | Performed by: INTERNAL MEDICINE

## 2024-02-01 PROCEDURE — 81001 URINALYSIS AUTO W/SCOPE: CPT | Performed by: NURSE PRACTITIONER

## 2024-02-01 RX ORDER — ONDANSETRON 4 MG/1
4 TABLET, FILM COATED ORAL EVERY 6 HOURS
Qty: 12 TABLET | Refills: 0 | Status: SHIPPED | OUTPATIENT
Start: 2024-02-01

## 2024-02-01 RX ORDER — ONDANSETRON 4 MG/1
4 TABLET, ORALLY DISINTEGRATING ORAL
Status: COMPLETED | OUTPATIENT
Start: 2024-02-01 | End: 2024-02-01

## 2024-02-01 RX ORDER — ALUMINUM HYDROXIDE, MAGNESIUM HYDROXIDE, AND SIMETHICONE 1200; 120; 1200 MG/30ML; MG/30ML; MG/30ML
30 SUSPENSION ORAL ONCE
Status: COMPLETED | OUTPATIENT
Start: 2024-02-01 | End: 2024-02-01

## 2024-02-01 RX ORDER — ACETAMINOPHEN 500 MG
1000 TABLET ORAL
Status: COMPLETED | OUTPATIENT
Start: 2024-02-01 | End: 2024-02-01

## 2024-02-01 RX ORDER — FUROSEMIDE 20 MG/1
20 TABLET ORAL 2 TIMES DAILY
Qty: 60 TABLET | Refills: 11 | Status: CANCELLED | OUTPATIENT
Start: 2024-02-01 | End: 2025-01-31

## 2024-02-01 RX ORDER — PROMETHAZINE HYDROCHLORIDE 25 MG/1
25 TABLET ORAL EVERY 6 HOURS PRN
Qty: 15 TABLET | Refills: 0 | Status: SHIPPED | OUTPATIENT
Start: 2024-02-01

## 2024-02-01 RX ADMIN — ACETAMINOPHEN 1000 MG: 500 TABLET ORAL at 07:02

## 2024-02-01 RX ADMIN — ALUMINUM HYDROXIDE, MAGNESIUM HYDROXIDE, AND SIMETHICONE 30 ML: 200; 200; 20 SUSPENSION ORAL at 08:02

## 2024-02-01 RX ADMIN — ONDANSETRON 4 MG: 4 TABLET, ORALLY DISINTEGRATING ORAL at 08:02
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
## 2024-02-01 NOTE — PATIENT INSTRUCTIONS
Call to set up an appointment at  [facility]:  [address] [place] [address]  [facility]  [phone]

## 2024-02-02 ENCOUNTER — TELEPHONE (OUTPATIENT)
Dept: INTERNAL MEDICINE | Facility: CLINIC | Age: 49
End: 2024-02-02
Payer: COMMERCIAL

## 2024-02-02 LAB
ESTIMATED AVG GLUCOSE: 111 MG/DL (ref 68–131)
HBA1C MFR BLD: 5.5 % (ref 4–5.6)

## 2024-02-02 NOTE — TELEPHONE ENCOUNTER
----- Message from Wilfredo Colindres MA sent at 2/2/2024 11:57 AM CST -----  Contact: [phone]  Patient    ----- Message -----  From: Joya Haley  Sent: 2/2/2024  11:47 AM CST  To: Julissa Leblanc Staff    Pt is calling in regards to her Neurology referral. University stated they have not received it. Pt was given a number to fax the referral too if it can please be re faxed. Thank you.  Fax # [phone]

## 2024-02-02 NOTE — ED PROVIDER NOTES
Encounter Date: 2/1/2024       History     Chief Complaint   Patient presents with    Nausea    Emesis    Diarrhea    Cough     Patient states that she began with cold like symptoms last week and was given anitbiotics.  She reports that her symptoms worsened today. She started with fever, and pain with cough and deep breath. Patient states that she received her covid and flu vaccination today.     48-year-old female presents for evaluation of multiple complaints.  She has been feeling weak, fatigued, having intermittent fevers, cough, nasal congestion and body aches ongoing for 4-5 days.  She was prescribed doxycycline for a stye in her left eye and symptoms have not improved.  In addition, patient went to her PCP today and had the vaccines for COVID and the flu.  She did not tell her primary care for iron that she has been sick.  Since receiving the shots, her symptoms of nausea have worsened.  She also reports dyspepsia and intermittent epigastric abdominal pain.  She is not currently feeling the pain.      Review of patient's allergies indicates:   Allergen Reactions    Iodine Swelling    Tramadol Anxiety, Diarrhea, Edema, Hallucinations, Other (See Comments), Palpitations, Shortness Of Breath and Swelling    Mobic [meloxicam]     Pcn [penicillins]     Sulfa (sulfonamide antibiotics)     Norco [hydrocodone-acetaminophen] Itching and Rash     No past medical history on file.  Past Surgical History:   Procedure Laterality Date    HYSTERECTOMY      partical     No family history on file.     Review of Systems   Constitutional:  Positive for fatigue. Negative for activity change, appetite change, chills, fever and unexpected weight change.   HENT:  Negative for dental problem and drooling.    Eyes:  Negative for discharge and itching.   Respiratory:  Negative for cough, chest tightness, shortness of breath, wheezing and stridor.    Cardiovascular:  Negative for chest pain, palpitations and leg swelling.    Gastrointestinal:  Positive for nausea and vomiting. Negative for abdominal distention, abdominal pain and diarrhea.   Genitourinary:  Negative for difficulty urinating, dysuria, frequency and urgency.   Musculoskeletal:  Positive for myalgias. Negative for back pain, gait problem and joint swelling.   Neurological:  Negative for dizziness, syncope, numbness and headaches.   Psychiatric/Behavioral:  Negative for agitation, behavioral problems and confusion.        Physical Exam     Initial Vitals [02/01/24 1904]   BP Pulse Resp Temp SpO2   132/81 99 (!) 22 (!) 100.9 °F (38.3 °C) 99 %      MAP       --         Physical Exam    Nursing note and vitals reviewed.  Constitutional: She appears well-developed and well-nourished. She is not diaphoretic.   HENT:   Head: Normocephalic and atraumatic.   Mouth/Throat: Oropharynx is clear and moist.   Eyes: EOM are normal. Pupils are equal, round, and reactive to light. Right eye exhibits no discharge. Left eye exhibits no discharge.   Neck: No tracheal deviation present.   Normal range of motion.  Cardiovascular:  Normal rate, regular rhythm and intact distal pulses.           Pulmonary/Chest: No respiratory distress. She has no wheezes. She exhibits no tenderness.   Abdominal: Abdomen is soft. She exhibits no distension. There is no abdominal tenderness.   Musculoskeletal:         General: No tenderness or edema. Normal range of motion.      Cervical back: Normal range of motion.     Neurological: She is alert and oriented to person, place, and time. She has normal strength. No cranial nerve deficit or sensory deficit. GCS eye subscore is 4. GCS verbal subscore is 5. GCS motor subscore is 6.   Skin: Skin is warm and dry. No rash noted.   Psychiatric: She has a normal mood and affect. Her behavior is normal. Thought content normal.         ED Course   Procedures  Labs Reviewed   CBC W/ AUTO DIFFERENTIAL - Abnormal; Notable for the following components:       Result Value     MCHC 31.0 (*)     Mono % 15.2 (*)     All other components within normal limits   URINALYSIS, REFLEX TO URINE CULTURE - Abnormal; Notable for the following components:    Specific Gravity, UA >1.030 (*)     Protein, UA 1+ (*)     Ketones, UA Trace (*)     Occult Blood UA Trace (*)     Urobilinogen, UA 2.0-3.0 (*)     All other components within normal limits   URINALYSIS MICROSCOPIC - Abnormal; Notable for the following components:    RBC, UA 18 (*)     Hyaline Casts, UA 9 (*)     All other components within normal limits   LIPASE   SARS-COV-2 RNA AMPLIFICATION, QUAL   INFLUENZA A AND B ANTIGEN          Imaging Results    None          Medications   acetaminophen tablet 1,000 mg (1,000 mg Oral Given 2/1/24 1954)   ondansetron disintegrating tablet 4 mg (4 mg Oral Given 2/1/24 2000)   aluminum-magnesium hydroxide-simethicone 200-200-20 mg/5 mL suspension 30 mL (30 mLs Oral Given 2/1/24 2000)     Medical Decision Making  Differential diagnosis includes viral illness, influenza, COVID, GERD, side effect of vaccinations,    Risk  OTC drugs.  Prescription drug management.               ED Course as of 02/02/24 0236   Fri Feb 02, 2024   0234 CBC W/ AUTO DIFFERENTIAL(!) [BS]   0234 Urinalysis, Reflex to Urine Culture(!) [BS]   0234 Urinalysis Microscopic(!) [BS]   0234 Lipase [BS]   0234 COVID-19 Rapid Screening [BS]   0234 Patient is overall well-appearing, nontoxic, no acute distress.  Abdomen is soft and nontender, doubt acute intra-abdominal pathology.  Labs consistent with mild dehydration, no other abnormality noted.  No room was available, so I offered that she could wait for IV hydration and also gave p.o. hydration which she tolerated.  Patient has signs and symptoms of viral illness, low-grade temperature, cough, congestion, muscle aches, worsened today by taking vaccines for COVID and influenza.  She is tolerating p.o. and symptoms improved after Maalox and Zofran.  She is stable for discharge with supportive  care, outpatient follow-up and return precautions. [BS]      ED Course User Index  [BS] Curtis Morales MD                           Clinical Impression:  Final diagnoses:  [B34.9] Viral syndrome (Primary)  [R11.2] Nausea and vomiting, unspecified vomiting type          ED Disposition Condition    Discharge Stable          ED Prescriptions       Medication Sig Dispense Start Date End Date Auth. Provider    ondansetron (ZOFRAN) 4 MG tablet Take 1 tablet (4 mg total) by mouth every 6 (six) hours. 12 tablet 2/1/2024 -- Curtis Morales MD    promethazine (PHENERGAN) 25 MG tablet Take 1 tablet (25 mg total) by mouth every 6 (six) hours as needed for Nausea. 15 tablet 2/1/2024 -- Curtis Morales MD          Follow-up Information       Follow up With Specialties Details Why Contact Info    Naif Castro MD Internal Medicine Call in 1 day To recheck today's symptoms [address] [facility] [address]  [phone]               Curtis Morales MD  02/02/24 0236

## 2024-02-02 NOTE — TELEPHONE ENCOUNTER
Hi, please fax these referrals to [facility]:  I just printed them  Please let patient know when they have been faxed  Thank you, Benji Costa

## 2024-02-02 NOTE — FIRST PROVIDER EVALUATION
Emergency Department TeleTriage Encounter Note      CHIEF COMPLAINT    Chief Complaint   Patient presents with    Nausea    Emesis    Diarrhea    Cough     Patient states that she began with cold like symptoms last week and was given anitbiotics.  She reports that her symptoms worsened today. She started with fever, and pain with cough and deep breath. Patient states that she received her covid and flu vaccination today.       VITAL SIGNS   Initial Vitals [02/01/24 1904]   BP Pulse Resp Temp SpO2   132/81 99 (!) 22 (!) 100.9 °F (38.3 °C) 99 %      MAP       --            ALLERGIES    Review of patient's allergies indicates:   Allergen Reactions    Iodine Swelling    Tramadol Anxiety, Diarrhea, Edema, Hallucinations, Other (See Comments), Palpitations, Shortness Of Breath and Swelling    Mobic [meloxicam]     Pcn [penicillins]     Sulfa (sulfonamide antibiotics)     Norco [hydrocodone-acetaminophen] Itching and Rash       PROVIDER TRIAGE NOTE  Verbal consent for the teletriage evaluation was given by the patient at the start of the evaluation.  All efforts will be made to maintain patient's privacy during the evaluation.      This is a teletriage evaluation of a 48 y.o. female presenting to the ED with c/o fever for 3 days.  Also report vomiting and body aches.  Received COVID and Flu vaccines today.  Tylenol taken at 3 pm, no Ibuprofen-allergy to Meloxicam. Limited physical exam via telehealth: The patient is awake, alert, answering questions appropriately and is not in respiratory distress.  As the Teletriage provider, I performed an initial assessment and ordered appropriate labs and imaging studies, if any, to facilitate the patient's care once placed in the ED. Once a room is available, care and a full evaluation will be completed by an alternate ED provider.  Any additional orders and the final disposition will be determined by that provider.  All imaging and labs will not be followed-up by the Teletriage  Team, including myself.          ORDERS  Labs Reviewed   CBC W/ AUTO DIFFERENTIAL   LIPASE   URINALYSIS, REFLEX TO URINE CULTURE   SARS-COV-2 RNA AMPLIFICATION, QUAL   INFLUENZA A AND B ANTIGEN   POCT URINE PREGNANCY       ED Orders (720h ago, onward)      Start Ordered     Status Ordering Provider    02/01/24 1915 02/01/24 1914  Vital signs  Every 2 hours         Ordered JESS HIDALGO    02/01/24 1915 02/01/24 1914  Influenza antigen Nasopharyngeal Swab  Once         Ordered GWEN JESS JUVENAL    02/01/24 1914 02/01/24 1914  Diet NPO  Diet effective now         Ordered GWEN JESS SANFORD    02/01/24 1914 02/01/24 1914  Insert peripheral IV  Once         Ordered GWEN JESS SANFORD    02/01/24 1914 02/01/24 1914  CBC W/ AUTO DIFFERENTIAL  STAT         Ordered GWEN, JESS SANFORD    02/01/24 1914 02/01/24 1914  Lipase  STAT         Ordered GWEN JESS SANFORD    02/01/24 1914 02/01/24 1914  Urinalysis, Reflex to Urine Culture Urine, Clean Catch  STAT         Ordered GWEN JESS JUVENAL    02/01/24 1914 02/01/24 1914  POCT urine pregnancy  Once         Ordered JESS HIDALGO    02/01/24 1914 02/01/24 1914  COVID-19 Rapid Screening  STAT         Ordered JESS HIDALGO              Virtual Visit Note: The provider triage portion of this emergency department evaluation and documentation was performed via [employer], a HIPAA-compliant telemedicine application, in concert with a tele-presenter in the room. A face to face patient evaluation with one of my colleagues will occur once the patient is placed in an emergency department room.      DISCLAIMER: This note was prepared with [employer] voice recognition transcription software. Garbled syntax, mangled pronouns, and other bizarre constructions may be attributed to that software system.

## 2024-02-02 NOTE — DISCHARGE INSTRUCTIONS

## 2024-02-06 LAB
6MAM UR QL: NOT DETECTED
7AMINOCLONAZEPAM UR QL: NOT DETECTED
A-OH ALPRAZ UR QL: NOT DETECTED
ALPHA-OH-MIDAZOLAM: NOT DETECTED
ALPRAZ UR QL: NOT DETECTED
AMPHET UR QL SCN: NOT DETECTED
ANNOTATION COMMENT IMP: NORMAL
BARBITURATES UR QL: NEGATIVE
BUPRENORPHINE UR QL: NOT DETECTED
BZE UR QL: NEGATIVE
CARBOXYTHC UR QL: NEGATIVE
CARISOPRODOL UR QL: NEGATIVE
CLONAZEPAM UR QL: NOT DETECTED
CODEINE UR QL: NOT DETECTED
CREAT UR-MCNC: 318.9 MG/DL (ref 20–400)
DIAZEPAM UR QL: NOT DETECTED
ETHYL GLUCURONIDE UR QL: NEGATIVE
FENTANYL UR QL: NOT DETECTED
GABAPENTIN: NOT DETECTED
HYDROCODONE UR QL: NOT DETECTED
HYDROMORPHONE UR QL: NOT DETECTED
LORAZEPAM UR QL: NOT DETECTED
MDA UR QL: NOT DETECTED
MDEA UR QL: NOT DETECTED
MDMA UR QL: NOT DETECTED
ME-PHENIDATE UR QL: NOT DETECTED
METHADONE UR QL: NEGATIVE
METHAMPHET UR QL: NOT DETECTED
MIDAZOLAM UR QL SCN: NOT DETECTED
MORPHINE UR QL: NOT DETECTED
NALOXONE: NOT DETECTED
NORBUPRENORPHINE UR QL CFM: NOT DETECTED
NORDIAZEPAM UR QL: NOT DETECTED
NORFENTANYL UR QL: NOT DETECTED
NORHYDROCODONE UR QL CFM: NOT DETECTED
NORMEPERIDINE UR QL CFM: NOT DETECTED
NOROXYCODONE UR QL CFM: NOT DETECTED
NOROXYMORPHONE UR QL SCN: NOT DETECTED
OXAZEPAM UR QL: NOT DETECTED
OXYCODONE UR QL: NOT DETECTED
OXYMORPHONE UR QL: NOT DETECTED
PATHOLOGY STUDY: NORMAL
PCP UR QL: NEGATIVE
PHENTERMINE UR QL: NOT DETECTED
PREGABALIN: NOT DETECTED
SERVICE CMNT-IMP: NORMAL
TAPENTADOL UR QL SCN: NOT DETECTED
TAPENTADOL UR QL SCN: NOT DETECTED
TEMAZEPAM UR QL: NOT DETECTED
TRAMADOL UR QL: NEGATIVE
ZOLPIDEM METABOLITE: NOT DETECTED
ZOLPIDEM UR QL: NOT DETECTED

## 2024-02-07 ENCOUNTER — PATIENT MESSAGE (OUTPATIENT)
Dept: INTERNAL MEDICINE | Facility: CLINIC | Age: 49
End: 2024-02-07
Payer: COMMERCIAL

## 2024-03-11 ENCOUNTER — TELEPHONE (OUTPATIENT)
Dept: ENDOSCOPY | Facility: HOSPITAL | Age: 49
End: 2024-03-11

## 2024-03-11 ENCOUNTER — PATIENT MESSAGE (OUTPATIENT)
Dept: ENDOSCOPY | Facility: HOSPITAL | Age: 49
End: 2024-03-11

## 2024-03-11 NOTE — TELEPHONE ENCOUNTER
Attempted to contact patient to schedule colonoscopy. The patient did not answer the call.  Left voice message requesting a call back at [phone] to get procedure scheduled.

## 2024-03-28 ENCOUNTER — PATIENT MESSAGE (OUTPATIENT)
Dept: ADMINISTRATIVE | Facility: OTHER | Age: 49
End: 2024-03-28
Payer: COMMERCIAL

## 2024-05-28 ENCOUNTER — TELEPHONE (OUTPATIENT)
Dept: NEUROLOGY | Facility: CLINIC | Age: 49
End: 2024-05-28
Payer: COMMERCIAL

## 2024-05-28 ENCOUNTER — ON-DEMAND VIRTUAL (OUTPATIENT)
Dept: URGENT CARE | Facility: CLINIC | Age: 49
End: 2024-05-28
Payer: MEDICAID

## 2024-05-28 DIAGNOSIS — R21 RASH: Primary | ICD-10-CM

## 2024-05-28 PROCEDURE — 99212 OFFICE O/P EST SF 10 MIN: CPT | Mod: 95,,, | Performed by: FAMILY MEDICINE

## 2024-05-28 RX ORDER — DOXYCYCLINE 100 MG/1
100 CAPSULE ORAL 2 TIMES DAILY
Qty: 14 CAPSULE | Refills: 0 | Status: SHIPPED | OUTPATIENT
Start: 2024-05-28 | End: 2024-06-04

## 2024-05-28 NOTE — TELEPHONE ENCOUNTER
I spoke with patient, Dr Ferraro will not be in office on 7/16/24, neurology appointment rescheduled to 5/31/24 @ 10:00 AM for a virtual appointment.

## 2024-05-28 NOTE — PROGRESS NOTES
Subjective:      Patient ID: Eliezer Au is a 48 y.o. female.    Vitals:  vitals were not taken for this visit.     Chief Complaint: Rash (Skin irritation and cough)      Visit Type: TELE AUDIOVISUAL    Present with the patient at the time of consultation: TELEMED PRESENT WITH PATIENT: None    No past medical history on file.  Past Surgical History:   Procedure Laterality Date    HYSTERECTOMY      partical     Review of patient's allergies indicates:   Allergen Reactions    Iodine Swelling    Shellfish containing products Swelling    Tramadol Anxiety, Diarrhea, Edema, Hallucinations, Other (See Comments), Palpitations, Shortness Of Breath and Swelling     Other Reaction(s): Other (See Comments)    Hallucinations    Codeine      Other Reaction(s): Unknown    Mobic [meloxicam]     Sulfa (sulfonamide antibiotics)     Hydrocodone Itching     Other Reaction(s): Unknown    Norco [hydrocodone-acetaminophen] Itching and Rash    Penicillins Rash    Sulfamethoxazole-trimethoprim Rash     Current Outpatient Medications on File Prior to Visit   Medication Sig Dispense Refill    acetaminophen (TYLENOL) 325 mg Cap       acyclovir (ZOVIRAX) 800 MG Tab Take 800 mg by mouth 5 (five) times daily.      amLODIPine (NORVASC) 10 MG tablet Take 10 mg by mouth once daily.      lacosamide (VIMPAT) 50 mg Tab Take 50 mg by mouth every 12 (twelve) hours.      ondansetron (ZOFRAN) 4 MG tablet Take 1 tablet (4 mg total) by mouth every 6 (six) hours. 12 tablet 0    promethazine (PHENERGAN) 25 MG tablet Take 1 tablet (25 mg total) by mouth every 6 (six) hours as needed for Nausea. 15 tablet 0     No current facility-administered medications on file prior to visit.     No family history on file.    Medications Ordered                [id number] - [place], [place] - [address] [facility] [address]   [address], [place] [address]    Telephone: [phone]   Fax: [phone]   Hours: Not open 24 hours                          E-Prescribed (1 of 1)              doxycycline (VIBRAMYCIN) 100 MG Cap    Sig: Take 1 capsule (100 mg total) by mouth 2 (two) times daily. for 7 days       Start: 5/28/24     Quantity: 14 capsule Refills: 0                           Ohs Peq Odvv Intake    5/28/2024  6:19 PM CDT - Filed by Patient   What is your current physical address in the event of a medical emergency? [address]  [place] [address]   Are you able to take your vital signs? No   Please attach any relevant images or files          49 yo female with a rash that is painful. Had bumps under the arm since December.  Notes that last night there was drainage from one of them.    Rash        Skin:  Positive for rash.        Objective:   The physical exam was conducted virtually.  Physical Exam   Pulmonary/Chest: Effort normal. No respiratory distress.   Neurological: She is alert.       Assessment:     1. Rash        Plan:       Rash  -     doxycycline (VIBRAMYCIN) 100 MG Cap; Take 1 capsule (100 mg total) by mouth 2 (two) times daily. for 7 days  Dispense: 14 capsule; Refill: 0      Please take your medicine with food.

## 2024-05-31 ENCOUNTER — TELEPHONE (OUTPATIENT)
Dept: NEUROLOGY | Facility: CLINIC | Age: 49
End: 2024-05-31
Payer: COMMERCIAL

## 2024-05-31 ENCOUNTER — OFFICE VISIT (OUTPATIENT)
Dept: NEUROLOGY | Facility: CLINIC | Age: 49
End: 2024-05-31
Payer: MEDICAID

## 2024-05-31 DIAGNOSIS — R56.9 CONVULSIONS, UNSPECIFIED CONVULSION TYPE: Primary | ICD-10-CM

## 2024-05-31 DIAGNOSIS — G40.909 NONINTRACTABLE EPILEPSY WITHOUT STATUS EPILEPTICUS, UNSPECIFIED EPILEPSY TYPE: ICD-10-CM

## 2024-05-31 PROCEDURE — 3044F HG A1C LEVEL LT 7.0%: CPT | Mod: CPTII,95,, | Performed by: INTERNAL MEDICINE

## 2024-05-31 PROCEDURE — 99205 OFFICE O/P NEW HI 60 MIN: CPT | Mod: 95,,, | Performed by: INTERNAL MEDICINE

## 2024-05-31 RX ORDER — LACOSAMIDE 200 MG/1
200 TABLET ORAL EVERY 12 HOURS
Qty: 60 TABLET | Refills: 11 | Status: SHIPPED | OUTPATIENT
Start: 2024-05-31 | End: 2025-05-31

## 2024-05-31 RX ORDER — DIAZEPAM 5 MG/1
5 TABLET ORAL ONCE
Qty: 1 TABLET | Refills: 0 | Status: SHIPPED | OUTPATIENT
Start: 2024-05-31 | End: 2024-05-31
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
## 2024-05-31 NOTE — TELEPHONE ENCOUNTER
Spoke with patient, assisted with scheduled MRI @ [facility] on  6/11/24 @ 1 PM, pre op instructions reviewed with patient.  [facility] will reach out to patient to schedule EEG.

## 2024-06-05 ENCOUNTER — TELEPHONE (OUTPATIENT)
Dept: NEUROLOGY | Facility: CLINIC | Age: 49
End: 2024-06-05
Payer: COMMERCIAL

## 2024-06-05 NOTE — TELEPHONE ENCOUNTER
EEG order, demographics, insurance and progress notes faxed to [facility] @ [phone].

## 2024-06-25 ENCOUNTER — HOSPITAL ENCOUNTER (OUTPATIENT)
Dept: RADIOLOGY | Facility: HOSPITAL | Age: 49
Discharge: HOME | End: 2024-06-25
Attending: INTERNAL MEDICINE
Payer: MEDICAID

## 2024-06-25 DIAGNOSIS — R56.9 CONVULSIONS, UNSPECIFIED CONVULSION TYPE: ICD-10-CM

## 2024-06-25 LAB
CREAT SERPL-MCNC: 0.8 MG/DL (ref 0.5–1.4)
SAMPLE: NORMAL

## 2024-06-25 PROCEDURE — 25500020 PHARM REV CODE 255: Mod: PO | Performed by: INTERNAL MEDICINE

## 2024-06-25 PROCEDURE — 70553 MRI BRAIN STEM W/O & W/DYE: CPT | Mod: 26,,, | Performed by: RADIOLOGY

## 2024-06-25 PROCEDURE — 82565 ASSAY OF CREATININE: CPT | Mod: PO

## 2024-06-25 PROCEDURE — A9585 GADOBUTROL INJECTION: HCPCS | Mod: PO | Performed by: INTERNAL MEDICINE

## 2024-06-25 RX ORDER — GADOBUTROL 604.72 MG/ML
11.5 INJECTION INTRAVENOUS
Status: COMPLETED | OUTPATIENT
Start: 2024-06-25 | End: 2024-06-25

## 2024-06-25 RX ADMIN — GADOBUTROL 11.5 ML: 604.72 INJECTION INTRAVENOUS at 03:06

## 2024-07-24 DIAGNOSIS — Z12.31 ENCOUNTER FOR SCREENING MAMMOGRAM FOR MALIGNANT NEOPLASM OF BREAST: Primary | ICD-10-CM

## 2024-08-21 ENCOUNTER — TELEPHONE (OUTPATIENT)
Dept: NEUROLOGY | Facility: CLINIC | Age: 49
End: 2024-08-21
Payer: MEDICAID

## 2024-08-21 NOTE — TELEPHONE ENCOUNTER
Spoke to pt and let her know we are not the ones who schedule the eegs she would have to reschedule with them

## 2024-11-05 ENCOUNTER — ON-DEMAND VIRTUAL (OUTPATIENT)
Dept: URGENT CARE | Facility: CLINIC | Age: 49
End: 2024-11-05
Payer: MEDICAID

## 2024-11-05 DIAGNOSIS — R05.1 ACUTE COUGH: Primary | ICD-10-CM

## 2024-11-05 PROCEDURE — 99213 OFFICE O/P EST LOW 20 MIN: CPT | Mod: 95,,, | Performed by: NURSE PRACTITIONER

## 2024-11-05 RX ORDER — PREDNISONE 20 MG/1
20 TABLET ORAL DAILY
Qty: 3 TABLET | Refills: 0 | Status: SHIPPED | OUTPATIENT
Start: 2024-11-05 | End: 2024-11-05

## 2024-11-05 RX ORDER — PREDNISONE 20 MG/1
20 TABLET ORAL DAILY
Qty: 3 TABLET | Refills: 0 | Status: SHIPPED | OUTPATIENT
Start: 2024-11-05 | End: 2024-11-08

## 2024-11-05 NOTE — PROGRESS NOTES
Subjective:      Patient ID: Eliezer Au is a 49 y.o. female.    Vitals:  vitals were not taken for this visit.     Chief Complaint: Cough      Visit Type: TELE AUDIOVISUAL - This visit was conducted virtually based on  subjective information and limited objective exam    Present with the patient at the time of consultation: TELEMED PRESENT WITH PATIENT: None  Two patient identifiers used to verify patient- saying out date of birth and full name.       History reviewed. No pertinent past medical history.  Past Surgical History:   Procedure Laterality Date    HYSTERECTOMY      partical     Review of patient's allergies indicates:   Allergen Reactions    Iodine Swelling    Shellfish containing products Swelling    Tramadol Anxiety, Diarrhea, Edema, Hallucinations, Other (See Comments), Palpitations, Shortness Of Breath and Swelling     Other Reaction(s): Other (See Comments)    Hallucinations    Codeine      Other Reaction(s): Unknown    Mobic [meloxicam]     Sulfa (sulfonamide antibiotics)     Hydrocodone Itching     Other Reaction(s): Unknown    Norco [hydrocodone-acetaminophen] Itching and Rash    Penicillins Rash    Sulfamethoxazole-trimethoprim Rash     Current Outpatient Medications on File Prior to Visit   Medication Sig Dispense Refill    acetaminophen (TYLENOL) 325 mg Cap       acyclovir (ZOVIRAX) 800 MG Tab Take 800 mg by mouth 5 (five) times daily.      amLODIPine (NORVASC) 10 MG tablet Take 10 mg by mouth once daily.      diazePAM (VALIUM) 5 MG tablet Take 1 tablet (5 mg total) by mouth once. TO BE TAKEN 1 HOUR PRIOR TO IMAGING for 1 dose 1 tablet 0    lacosamide (VIMPAT) 200 mg Tab tablet Take 1 tablet (200 mg total) by mouth every 12 (twelve) hours. 60 tablet 11    ondansetron (ZOFRAN) 4 MG tablet Take 1 tablet (4 mg total) by mouth every 6 (six) hours. 12 tablet 0    promethazine (PHENERGAN) 25 MG tablet Take 1 tablet (25 mg total) by mouth every 6 (six) hours as needed for Nausea. 15 tablet 0      No current facility-administered medications on file prior to visit.     No family history on file.    Medications Ordered                [id number] - [place], [place] - [address] [place] [facility] [place] [address]   [address], [place] [address]    Telephone: [phone]   Fax: [phone]   Hours: Not open 24 hours                         E-Prescribed (1 of 1)              predniSONE (DELTASONE) 20 MG tablet    Sig: Take 1 tablet (20 mg total) by mouth once daily. for 3 days       Start: 11/5/24     Quantity: 3 tablet Refills: 0                           Ohs Peq Odvv Intake    11/5/2024  4:21 PM CST - Filed by Patient   What is your current physical address in the event of a medical emergency? [address] [place] [id number]   Are you able to take your vital signs? Yes   Systolic Blood Pressure: 152   Diastolic Blood Pressure: 90   Weight: 250   Height: 54   Pulse: 91   Temperature: 101.2   Respiration rate: 0   Pulse Oxygen: 0   Please attach any relevant images or files    Is your employer contracted with [facility]? No         50 yo female with c/o left ear pain and cough for the past several days. She states she had 101 fever today. She states ear pain 2 weeks ago. And cough is constant. She states sprite helps with cough.         Constitution: Negative.   HENT:  Positive for ear pain.    Cardiovascular: Negative.    Respiratory:  Positive for cough and sputum production.    Gastrointestinal: Negative.    Endocrine: negative.   Genitourinary: Negative.  Negative for frequency and urgency.   Musculoskeletal: Negative.    Skin: Negative.    Allergic/Immunologic: Negative.    Neurological: Negative.    Hematologic/Lymphatic: Negative.    Psychiatric/Behavioral: Negative.          Objective:   The physical exam was conducted virtually.  LOCATION OF PATIENT [place]  AAO x 3 ; no acute distress noted; appearance normal; mood and behavior normal; thought process  normal  Head- normocephalic  Nose- appears normal, no discharge or erythema  Eyes- pupils appear normal in size, no drainage, no erythema  Ears- normal appearing; no discharge, no erythema  Mouth- appears normal  Oropharynx- no erythema, lesions  Lungs- breathing at a normal rate, no acute distress noted  Heart- no reports of tachycardia, palpitations, chest pain  Abdomen- non distended, non tender reported by patient  Skin- warm and dry, no erythema or edema noted by patient or visualized  Psych- as above; no si/hi      Assessment:     1. Acute cough        Plan:     Patient Instructions   - Stay hydrated, drink plenty of water, and REST.  - OTC guaifenesin (plain Mucinex) for thick nasal/sinus congestion.    - OTC Robitussin DM or Mucinex DM for congestion with cough (guaifenesin + dextromethorphan).  - OTC Flonase or Nasonex for sinus congestion.   - OTC Simply Saline (e.g. Arm & Hammer) for irritated and raw nasal passages.  - OTC Vicks VapoCOOL spray and Cepacol throat lozenges for sore throat.  - OTC ibuprofen or acetaminophen alternating every 4-6 hours as needed for aches/pains/high fever.  - OTC Airborne or Emergen-C have ingredients like Zinc, Echinacea, and vitamin-C to help boost the immune system. Elderberry is also good for this.      Symptoms of upper respiratory infection (URI) or any acute rhinosinusitis can be treated with the following medications available over the counter. Take these according to the package instructions with the permission of your primary care provider and/or specialist(s).      Many products contain multiple medications in one, so be sure to check the ingredient list for any over the counter medication to be sure you are not taking the same medication in multiple ways.      Aches, pain, and fever:   acetaminophen (Tylenol)  NSAIDs (Motrin, Advil)* (avoid these in kidney disease)     Congestion:   Netti Pot  Guaifenesin (Mucinex, Robitussin)  Phenylephrine  Pseudoephedrine  "(Sudafed)  Nasal steroid spray (Nasacort, Flonase, Rhinocort)  Nasal saline     Cough/mucus expectorant:  Guaifenesin (Robitussin)  Menthol ointment (Vicks) (topically)     Cough suppressant:  Dextromethoraphan (Delsym)     Coughing is a normal body mechanism for removing secretions from lungs.   We suggest you avoid cough suppressants unless your cough is continuous or causing a disruption in your every day activities or sleep/rest.   If you were given a prescription for a cough suppressant, take it at night or when you are at home because it will make you drowsy.     Sore throat:  Cough drops  Throat spray  Salt water gargles  Warm water, honey, and a capful of Apple Cider vinegar gargles     Adults may take 2 teaspoons of honey nightly to help with a cough. This can be mixed with a mug of warm water and the juice of half of a lemon.    Children over the age of 5 years can be given 1 teaspoon nightly for cough. Honey should never be given to children less than 1 year old.       Runny nose/sneezing/allergies:  Antihistamine  Nasal steroid  Nasal saline reduces inflammation and dryness.     Warm face compresses help with facial sinus pain/pressure.     If you DO NOT have hypertension (high blood pressure) or any history of palpitations, it is okay to take over-the-counter Sudafed, Mucinex-D, Allegra-D, Claritin-D, Zyrtec-D. These can be found be asking the pharmacy staff because they are kept behind the counter.      If you DO have hypertension (high blood pressure) or palpitations, only take medications with a red heart on the box indicating it is "heart" safe. For example, it is safe to take Coricidin HBP for relief of sinus symptoms. Vicks NyQuil High Blood Pressure Cold& Flu, Ariel's Dayquil HBP, Mucinex, Benadryl, Zyrtec, Claritin, or Allegra are all safe for patient's with high blood pressure.     Be sure to wash your hands often. Do not share drinks. Attempt to cough into the curve of your elbow or into your " "hands.      To minimize the chance of re-infection, change your toothbrush after 3-4 days on antibiotics.  Alternatively, buy a multi-pack of toothbrushes (they can be found inexpensively at the dollar store) and use one per day, discarding it at the end of the day and then start with a new "regular" toothbrush after that.     Please take all of your antibiotics if you were prescribed them. If for any reason you do not complete the course of antibiotics, please throw the remainder away. It's very important to complete your entire course of medication treatment. Often patients will discontinue antibiotics after just a few days when they begin to feel better. This OFTEN leads to a return of your illness at a later time which is then more difficult to treat.      Antibiotics may cause diarrhea. Please make sure you take your antibiotics with food. If the diarrhea becomes profuse, you may try over the counter Immodium AD or Pepto Bismol to help slow down the diarrhea.  Pepto Bismol can turn your stool black if taken for several doses.      Taking an over-the-counter probiotic while you are taking the antibiotics can help to reduce GI upset as well as reduce the chances for developing a yeast infection.  Be sure to take the probiotic at a different time from the antibiotic, for example, if you take the antibiotic in the morning and at night then take your probiotic at lunch.  Be sure to eat with your antibiotic to help reduce nausea with taking it.     If your condition worsens or fails to improve, we recommend that you receive another evaluation at the emergency room immediately or contact your primary medical clinic to discuss your concern.     Thank you for choosing [facility] today.     PLEASE ONLY TAKE MEDICATIONS APPROVED BY YOUR PRIMARY CARE PROVIDER AND SPECIALISTS.     Please understand that you've received an Urgent Care treatment only and that you may be released before all your medical problems are " known or treated. You, the patient, will arrange for follow up care as instructed. Follow up with your PCP/specialty clinic as directed in the next 1-2 weeks if not improved or as needed. If your condition significantly worsens, we recommend that you receive another evaluation at the emergency room.     If you were prescribed an antibiotic, please take all of the medication as directed.     If you smoke, please stop smoking.          Urgent Care  You must understand that you've received an Urgent Care treatment only and that you may be released before all your medical problems are known or treated. You, the patient, will arrange for follow up care as instructed.     Follow up with your PCP or specialty clinic as directed in the next 1-2 weeks if not improved or as needed.  If your condition worsens we recommend that you receive another evaluation at the emergency room immediately or contact your primary medical clinics after hours call service to discuss your concerns.     If you were prescribed a narcotic or controlled medication, do not drive or operate heavy equipment or machinery while taking these medications.  If you were prescribed an antibiotic, please take all of the medication as directed.     Please go to the ER for severe pain, fever, difficulty breathing, high fever, altered mental status, or unable to hydrate by mouth, or acute changes to urinary/gastro-intestinal function, severe or worsening symptoms, acute neurological changes such as numbness/weakness/tingling to the extremities, changes to vision.      If you smoke or use other tobacco products, please stop smoking/using tobacco products. If you do not use tobacco products or smoke, please do not start.              Thank you for choosing [facility]!    Our goal in the [facility] is to always provide outstanding medical care. You may receive a survey by mail or e-mail in the next week regarding your experience  today. We would greatly appreciate you completing and returning the survey. Your feedback provides us with a way to recognize our staff who provide very good care, and it helps us learn how to improve when your experience was below our aspiration of excellence.         We appreciate you trusting us with your medical care. We hope you feel better soon. We will be happy to take care of you for all of your future medical needs.    You must understand that you've received an Urgent Care treatment only and that you may be released before all your medical problems are known or treated. You, the patient, will arrange for follow up care as instructed.    Follow up with your PCP or specialty clinic as directed in the next 1-2 weeks if not improved or as needed.  You can call [phone] to schedule an appointment with the appropriate provider.    If your condition worsens we recommend that you receive another evaluation in person, with your primary care provider, urgent care or at the emergency room immediately or contact your primary medical clinics after hours call service to discuss your concerns.         Acute cough  -     predniSONE (DELTASONE) 20 MG tablet; Take 1 tablet (20 mg total) by mouth once daily. for 3 days  Dispense: 3 tablet; Refill: 0

## 2024-11-15 ENCOUNTER — HOSPITAL ENCOUNTER (OUTPATIENT)
Dept: RADIOLOGY | Facility: HOSPITAL | Age: 49
Discharge: HOME | End: 2024-11-15
Attending: FAMILY MEDICINE
Payer: MEDICAID

## 2024-11-15 ENCOUNTER — HOSPITAL ENCOUNTER (OUTPATIENT)
Dept: RADIOLOGY | Facility: CLINIC | Age: 49
Discharge: HOME | End: 2024-11-15
Attending: FAMILY MEDICINE
Payer: MEDICAID

## 2024-11-15 VITALS — BODY MASS INDEX: 43.65 KG/M2 | WEIGHT: 262 LBS | HEIGHT: 65 IN

## 2024-11-15 DIAGNOSIS — Z12.31 ENCOUNTER FOR SCREENING MAMMOGRAM FOR MALIGNANT NEOPLASM OF BREAST: ICD-10-CM

## 2024-11-15 PROCEDURE — 77067 SCR MAMMO BI INCL CAD: CPT | Mod: TC,PO

## 2024-11-15 PROCEDURE — 77067 SCR MAMMO BI INCL CAD: CPT | Mod: 26,,, | Performed by: RADIOLOGY

## 2024-11-15 PROCEDURE — 77063 BREAST TOMOSYNTHESIS BI: CPT | Mod: 26,,, | Performed by: RADIOLOGY

## 2024-12-04 DIAGNOSIS — R29.818 TRANSIENT NEUROLOGICAL SYMPTOMS: Primary | ICD-10-CM

## 2024-12-04 DIAGNOSIS — R29.818 TRANSIENT NEUROLOGICAL SYMPTOMS: ICD-10-CM

## 2024-12-09 ENCOUNTER — HOSPITAL ENCOUNTER (OUTPATIENT)
Dept: RADIOLOGY | Facility: HOSPITAL | Age: 49
Discharge: HOME | End: 2024-12-09
Attending: NURSE PRACTITIONER
Payer: MEDICAID

## 2024-12-09 ENCOUNTER — HOSPITAL ENCOUNTER (OUTPATIENT)
Dept: CARDIOLOGY | Facility: HOSPITAL | Age: 49
Discharge: HOME | End: 2024-12-09
Attending: NURSE PRACTITIONER
Payer: MEDICAID

## 2024-12-09 VITALS — WEIGHT: 261.94 LBS | HEIGHT: 65 IN | BODY MASS INDEX: 43.64 KG/M2

## 2024-12-09 DIAGNOSIS — R29.818 TRANSIENT NEUROLOGICAL SYMPTOMS: ICD-10-CM

## 2024-12-09 PROCEDURE — 93306 TTE W/DOPPLER COMPLETE: CPT

## 2024-12-09 PROCEDURE — 93880 EXTRACRANIAL BILAT STUDY: CPT | Mod: TC

## 2024-12-09 PROCEDURE — 93306 TTE W/DOPPLER COMPLETE: CPT | Mod: 26,,, | Performed by: GENERAL PRACTICE

## 2024-12-09 PROCEDURE — 70544 MR ANGIOGRAPHY HEAD W/O DYE: CPT | Mod: TC

## 2024-12-09 PROCEDURE — 70544 MR ANGIOGRAPHY HEAD W/O DYE: CPT | Mod: 26,,, | Performed by: RADIOLOGY

## 2024-12-09 PROCEDURE — 93880 EXTRACRANIAL BILAT STUDY: CPT | Mod: 26,,, | Performed by: RADIOLOGY

## 2024-12-10 LAB
AORTIC ROOT ANNULUS: 2.8 CM
AORTIC VALVE CUSP SEPERATION: 1.8 CM
AV INDEX (PROSTH): 0.85
AV MEAN GRADIENT: 8 MMHG
AV PEAK GRADIENT: 16 MMHG
AV VALVE AREA BY VELOCITY RATIO: 2.5 CM²
AV VALVE AREA: 2.7 CM²
AV VELOCITY RATIO: 0.8
BSA FOR ECHO PROCEDURE: 2.33 M2
CV ECHO LV RWT: 0.51 CM
DOP CALC AO PEAK VEL: 2 M/S
DOP CALC AO VTI: 31.4 CM
DOP CALC LVOT AREA: 3.1 CM2
DOP CALC LVOT DIAMETER: 2 CM
DOP CALC LVOT PEAK VEL: 1.6 M/S
DOP CALC LVOT STROKE VOLUME: 84.2 CM3
DOP CALC MV VTI: 24.6 CM
DOP CALCLVOT PEAK VEL VTI: 26.8 CM
E WAVE DECELERATION TIME: 185 MSEC
E/A RATIO: 0.67
E/E' RATIO: 8.2 M/S
ECHO LV POSTERIOR WALL: 1.1 CM (ref 0.6–1.1)
FRACTIONAL SHORTENING: 34.9 % (ref 28–44)
INTERVENTRICULAR SEPTUM: 1.1 CM (ref 0.6–1.1)
IVC DIAMETER: 2 CM
IVRT: 74 MSEC
LEFT ATRIUM SIZE: 4 CM
LEFT INTERNAL DIMENSION IN SYSTOLE: 2.8 CM (ref 2.1–4)
LEFT VENTRICLE DIASTOLIC VOLUME INDEX: 37.42 ML/M2
LEFT VENTRICLE DIASTOLIC VOLUME: 83.07 ML
LEFT VENTRICLE MASS INDEX: 73.4 G/M2
LEFT VENTRICLE SYSTOLIC VOLUME INDEX: 13.3 ML/M2
LEFT VENTRICLE SYSTOLIC VOLUME: 29.55 ML
LEFT VENTRICULAR INTERNAL DIMENSION IN DIASTOLE: 4.3 CM (ref 3.5–6)
LEFT VENTRICULAR MASS: 162.9 G
LV LATERAL E/E' RATIO: 7.45 M/S
LV SEPTAL E/E' RATIO: 9.11 M/S
LVED V (TEICH): 83.07 ML
LVES V (TEICH): 29.55 ML
LVOT MG: 5 MMHG
LVOT MV: 1.07 CM/S
MV MEAN GRADIENT: 3 MMHG
MV PEAK A VEL: 1.23 M/S
MV PEAK E VEL: 0.82 M/S
MV PEAK GRADIENT: 9 MMHG
MV VALVE AREA BY CONTINUITY EQUATION: 3.42 CM2
PV MV: 0.83 M/S
PV PEAK GRADIENT: 6 MMHG
PV PEAK VELOCITY: 1.21 M/S
RIGHT VENTRICULAR END-DIASTOLIC DIMENSION: 2 CM
RV TISSUE DOPPLER FREE WALL SYSTOLIC VELOCITY 1 (APICAL 4 CHAMBER VIEW): 21.3 CM/S
TDI LATERAL: 0.11 M/S
TDI SEPTAL: 0.09 M/S
TDI: 0.1 M/S
TRICUSPID ANNULAR PLANE SYSTOLIC EXCURSION: 2.63 CM
Z-SCORE OF LEFT VENTRICULAR DIMENSION IN END DIASTOLE: -5.9
Z-SCORE OF LEFT VENTRICULAR DIMENSION IN END SYSTOLE: -4.1

## 2025-01-07 ENCOUNTER — TELEPHONE (OUTPATIENT)
Dept: CARDIOLOGY | Facility: CLINIC | Age: 50
End: 2025-01-07
Payer: MEDICAID

## 2025-01-07 NOTE — TELEPHONE ENCOUNTER
LVM for patient due to none of our providers taking his insurance currently. Gave patient the number for the Medicaid line to see which providers are accepting her insurance.

## 2025-01-14 ENCOUNTER — CLINICAL SUPPORT (OUTPATIENT)
Dept: REHABILITATION | Facility: HOSPITAL | Age: 50
End: 2025-01-14
Attending: NURSE PRACTITIONER
Payer: MEDICAID

## 2025-01-14 DIAGNOSIS — R26.89 IMBALANCE: ICD-10-CM

## 2025-01-14 DIAGNOSIS — R26.89 BALANCE PROBLEM: Primary | ICD-10-CM

## 2025-01-14 DIAGNOSIS — R29.898 BILATERAL LEG WEAKNESS: ICD-10-CM

## 2025-01-14 PROCEDURE — 97162 PT EVAL MOD COMPLEX 30 MIN: CPT | Mod: PO

## 2025-01-14 PROCEDURE — 97110 THERAPEUTIC EXERCISES: CPT | Mod: PO

## 2025-01-15 ENCOUNTER — HOSPITAL ENCOUNTER (OUTPATIENT)
Dept: RADIOLOGY | Facility: HOSPITAL | Age: 50
Discharge: HOME | End: 2025-01-15
Attending: NURSE PRACTITIONER
Payer: MEDICAID

## 2025-01-15 DIAGNOSIS — R29.818 TRANSIENT NEUROLOGICAL SYMPTOMS: ICD-10-CM

## 2025-01-15 PROCEDURE — 70551 MRI BRAIN STEM W/O DYE: CPT | Mod: TC,PO

## 2025-01-15 PROCEDURE — 70551 MRI BRAIN STEM W/O DYE: CPT | Mod: 26,,, | Performed by: RADIOLOGY

## 2025-01-16 PROBLEM — R29.898 BILATERAL LEG WEAKNESS: Status: ACTIVE | Noted: 2025-01-16

## 2025-01-16 PROBLEM — R26.89 IMBALANCE: Status: ACTIVE | Noted: 2025-01-16

## 2025-01-16 NOTE — PLAN OF CARE
[facility]  Physical Therapy Neurological Rehabilitation Initial Evaluation    Name: Eliezer Au  Clinic Number: [id number]    Therapy Diagnosis:   Encounter Diagnoses   Name Primary?    Balance problem Yes    Bilateral leg weakness     Imbalance      Physician: Carolann Bagley NP    Physician Orders: physical therapy evaluation and treat; vestibular rehab therapy   Medical Diagnosis from Referral: balance problem  Evaluation Date: 1/14/2025  Authorization Period Expiration: 01/07/2026  Plan of Care Expiration: 03/01/2025  Visit # / Visits authorized: 1/ 1    Time In: 1730  Time Out: 1810  Total Billable Time: 40 minutes    Precautions: Fall      Subjective     Date of onset: 01/07/2025  History of Current Symptoms, Eliezer reports: about one year history of worsening stability; patient states that she frequently stumbles or trips due to left sided instability; Eliezer endorses history of seizures and subsequent symptoms - left sided numbness, difficulty controlling left side extremities, nausea, left sided headache and dizziness.      History of migraines: no     Medical History:   No past medical history on file.    Surgical History:   Eliezer Au  has a past surgical history that includes Hysterectomy.    Medications:   Eliezer has a current medication list which includes the following prescription(s): acetaminophen, acyclovir, amlodipine, diazepam, lacosamide, ondansetron, and promethazine.    Allergies:   Review of patient's allergies indicates:   Allergen Reactions    Iodine Swelling    Shellfish containing products Swelling    Tramadol Anxiety, Diarrhea, Edema, Hallucinations, Other (See Comments), Palpitations, Shortness Of Breath and Swelling     Other Reaction(s): Other (See Comments)    Hallucinations    Codeine      Other Reaction(s): Unknown    Mobic [meloxicam]     Sulfa (sulfonamide antibiotics)     Hydrocodone Itching     Other Reaction(s): Unknown    Norco  [hydrocodone-acetaminophen] Itching and Rash    Penicillins Rash    Sulfamethoxazole-trimethoprim Rash      Imagine (MRI BRAIN W WO CONTRAST on 06/25/2024):     No acute intracranial abnormality.     Slight asymmetric prominence of the temporal horn of the right lateral ventricle, unchanged.     Minimal mucosal thickening within the paranasal sinuses.      Prior Therapy: none  Social History: lives with her family in 1-story raised home  Falls: recurring   DME: none   Prior Level of Function: supervision needed  Current Level of Function: minimal difficulty with activities of daily living     Pain:  Current 0/10, worst 5/10, best 0/10   Location: left side headache   Description: Aching and Dull  Aggravating Factors: exertional activity  Easing Factors: rest    Pts goals: improve stability       Objective     - Follows commands: 75% of time   - Speech deficits: mild dysarthria    Functional Mobility & ADLs:   Sit to stand: stand by assist     Mental status: alert, oriented to person, place, and time, normal mood, behavior, speech, dress, motor activity, and thought processes  Appearance: Casually dressed  Behavior:  calm and cooperative  Attention Span and Concentration:  Impaired to some degree    Posture Alignment in sitting:   Head: forward head     Sensation: Light Touch: Impaired: tingling/numbness left sided extremities          Proprioception: Intact, Kinesthesia Intact    Coordination:   - fine motor: within functional limits   - UE coordination: within functional limits    - LE coordination:  within functional limits    RANGE OF MOTION--LOWER EXTREMITIES  (R) LE Hip: normal   Knee: normal   Ankle: normal    (L) LE: Hip: normal   Knee: normal   Ankle: normal    Strength: manual muscle test grades below     Lower Extremity Strength  Right LE  Left LE    Hip flexion:  3+/5 Hip flexion: 2+/5   Knee extension: 3+/5 Knee extension: 3-/5   Ankle dorsiflexion:  3+/5 Ankle dorsiflexion: 3-/5       TINETTI BALANCE  ASSESSMENT TOOL    Savageetti ME, Raj TF, Cody R, Fall Risk Index for elderly patients based on number of chronic disabilities.Am J Med [id number]      BALANCE SECTION  Patient is seated in hard, armless chair;    1.Sitting Balance:   Steady; safe = 1  2.Rises from chair :  Able, without using arms = 2  3.Attempts to arise :  Able, requirese > 1 attempt = 1  4.Immediate standing Balance (first 5 seconds) : Steady without walker or other support = 2  5.Standing balance: Narrow stance without support = 2  6.Nudged : Staggers, grabs, catches self = 1  7.Eyes closed: Unsteady = 0  8.Turning 360 degrees:  Discontinuous steps = 0 and Unsteady (grabs, staggers) = 0  9.Sitting Down : Uses arms or not a smooth motion = 1    Balance Score:  10/16    GAIT SECTION  Patient stands with therapist, walks across room (+/- aids), first at usual pace, then at rapid pace.    10.Initiation of Gait (Immediately after told to go.): Any hesitancy or multiple attempts to start = 0  11.Step length and height :  Step through R=1 and Step through L=1  12.Foot Clearance :  L foot clears floor=1; R foot clears floor=1  13.Step symmetry: Right & Left step length not equal (estimate) = 0  14.Step continuity : Steps appear continuous = 1  15.Path: Mild/moderate deviation or uses walking aid = 1  16.Trunk : No sway, but flexion of knees or back or spreads arm out while walking = 1  17.Walking Time: Heels apart = 0    Gait Score: 7/12  Balance score:10/16  Total Score=Balance + Gait Score: 17/28     Risk Indicators:    Tinetti Tool Score   Risk of Falls   <=18    High   19-23   Moderate   >=24   Low       Gait Assessment:(if indicated)  - AD used: none  - Assistance: stand by assist   - Distance: 120 feet     GAIT DEVIATIONS:  Eliezer displays the following deviations with ambulation: mild path deviation    Impairments contributing to deviations: dysequilibrium     Endurance Deficit: minimal complaints of fatigue         Intake Outcome  Measure for FOTO Balance Survey    Therapist reviewed FOTO scores for Eliezer Au on 1/14/2025.   FOTO documents entered into [employer] - see Media section.    Intake Score: 48.1% disability         TREATMENT     Treatment Time In: 1800  Treatment Time Out: 1810  Total Treatment time separate from Evaluation: 10 minutes    Eliezer participated in neuromuscular re-education activities to assess: Balance and Vestibular function for 10 minutes. The following activities were included:    X 10 static standing holds  X 10 seconds stand with eyes closed  X 1 each turn 360 degrees in place = clockwise/counterclockwise  Functional ambulation 120 feet with stand by assist       Home Exercises and Patient Education Provided    Education provided:   - proper therapeutic exercise technique  - treatment plan    Written Home Exercises Provided: to be provided at future appointment.      Assessment     Eliezer is a 49 y.o. female referred to outpatient Physical Therapy with a medical diagnosis of balance problem. Pt presents to PT with the following impairments leading to her functional decline: lower extremity weakness and imbalance. Patient's condition is likely related to her seizures.    Pt prognosis is Fair.   Pt will benefit from skilled outpatient Physical Therapy to address the deficits stated above and in the chart below, provide pt/family education, and to maximize pt's level of independence.     Plan of care discussed with patient: Yes  Pt's spiritual, cultural and educational needs considered and patient is agreeable to the plan of care and goals as stated below:     Anticipated Barriers for therapy: severity of symptoms    Medical Necessity is demonstrated by the following  History  Co-morbidities and personal factors that may impact the plan of care [] LOW: no personal factors / co-morbidities  [x] MODERATE: 1-2 personal factors / co-morbidities  [] HIGH: 3+ personal factors / co-morbidities    Moderate / High Support  Documentation: history of abdominal surgery     Examination  Body Structures and Functions, activity limitations and participation restrictions that may impact the plan of care [] LOW: addressing 1-2 elements  [x] MODERATE: 3+ elements  [] HIGH: 4+ elements (please support below)    Moderate / High Support Documentation: strength; balance; gait     Clinical Presentation [] LOW: stable  [x] MODERATE: Evolving  [] HIGH: Unstable     Decision Making/ Complexity Score: moderate       Goals:    Short Term Goals (3 Weeks):   Patient to report no new falls.  Patient to tolerate x 45 seconds each bilateral step stance training, eyes open to improve upright tolerance.  Patient to begin balance home exercise program.     Long Term Goals (6 Weeks):   Patient to demonstrate competence with home exercise program to maintain therapeutic gains.  2.   Patient to ambulate 20 feet in less than 9 seconds to improve quinn/symmetry.  3.   Patient to perform floor ladder high stepping with minimal loss of balance.      Plan     Plan of care Certification: 1/14/2025 to 03/01/2025.    Outpatient Physical Therapy evaluation, plus 2 times weekly for 6 weeks to include the following interventions (starting week of 01/20/25): Gait Training, Manual Therapy, Moist Heat/ Ice, Neuromuscular Re-ed, Patient Education, Self Care, Therapeutic Activities, Therapeutic Exercise, and home exercise program .     Jose Martin Martini, PT

## 2025-01-24 ENCOUNTER — CLINICAL SUPPORT (OUTPATIENT)
Dept: REHABILITATION | Facility: HOSPITAL | Age: 50
End: 2025-01-24
Attending: NURSE PRACTITIONER
Payer: MEDICAID

## 2025-01-24 DIAGNOSIS — R41.3 MEMORY CHANGES: Primary | ICD-10-CM

## 2025-01-24 PROCEDURE — 92523 SPEECH SOUND LANG COMPREHEN: CPT | Mod: PO
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
## 2025-01-30 ENCOUNTER — CLINICAL SUPPORT (OUTPATIENT)
Dept: REHABILITATION | Facility: HOSPITAL | Age: 50
End: 2025-01-30
Payer: MEDICAID

## 2025-01-30 DIAGNOSIS — R29.898 BILATERAL LEG WEAKNESS: Primary | ICD-10-CM

## 2025-01-30 DIAGNOSIS — R41.3 MEMORY CHANGES: Primary | ICD-10-CM

## 2025-01-30 DIAGNOSIS — R26.89 IMBALANCE: ICD-10-CM

## 2025-01-30 PROCEDURE — 97110 THERAPEUTIC EXERCISES: CPT | Mod: PO,CQ

## 2025-01-30 PROCEDURE — 92507 TX SP LANG VOICE COMM INDIV: CPT | Mod: PO

## 2025-01-30 NOTE — PROGRESS NOTES
[facility]   Physical Therapy Treatment Note      Name: Eliezer FERRARA Au  Clinic Number: [id number]    Therapy Diagnosis:   Encounter Diagnoses   Name Primary?    Bilateral leg weakness Yes    Imbalance      Physician: Carolann Bagley NP    Visit Date: 1/30/2025    Physician Orders: physical therapy evaluation and treat; vestibular rehab therapy   Medical Diagnosis from Referral: balance problem  Evaluation Date: 1/14/2025  Authorization Period Expiration: 12/31/2025  Plan of Care Expiration: 03/01/2025  Visit # / Visits authorized: 2/10     Time In:   1607   Time Out:   1701   Total Time:   54 minutes  Total Billable Time:   54 minutes     Precautions: Fall    PTA Visit #: 1/5       Subjective     Patient reports: last fall was this morning when circling around her bed while making the bed,  and her foot caught the leg of the bed. Fell to left side and scarped her lateral left lower leg and bottom. Did not hit her head. Brought to clinic per robert' who remained in waiting area during session.     She  n/a  compliant with home exercise program.  Response to previous treatment: no complaints  Functional change: none stated    Pain: 1/10  Location: bilateral thighs     Objective      Objective Measures updated at progress report unless specified.     Treatment     Eliezer received the treatments listed below:      therapeutic exercises to develop strength, endurance, ROM, flexibility, posture, core stabilization, and balance, coordination, proprioception for 54 minutes including:  X 10 static standing holds  X 10 seconds stand with eyes closed  X 1 each turn 360 degrees in place = clockwise/counterclockwise  Functional ambulation 120 feet with stand by assist   Lateral walking x 2 laps  Tandem stance x 30s each  LAQ x 10  Seated hip flexion x 10  360* turns with cues for LE clearance and focal point, slowly  Sit<>stands from arm chair without UE's x 5  Walking over small obstacles there and  back with UE support          Patient Education and Home Exercises       Education provided:   - Educated pt that he/she may feel soreness after session.      Written Home Exercises Provided: Yes. Exercises were reviewed and Eliezer was able to demonstrate them prior to the end of the session.  Eliezer demonstrated good  understanding of the education provided. See Electronic Medical Record under Patient Instructions for exercises provided during therapy sessions    Assessment     Discussed proper footwear (wearing flats with narrow toe box and no ). Apprehensive with new exercises, but agreeable to try. Trembling LE's when apprehensive or fatigued, and requires pacing for improved tolerance.  Will benefit from continued physical therapy intervention to progress toward goals set forth in plan of care to improve functional mobility and quality of life.     Eliezer Is progressing well towards her goals.   Patient prognosis is Fair.     Patient will continue to benefit from skilled outpatient physical therapy to address the deficits listed in the problem list box on initial evaluation, provide pt/family education and to maximize pt's level of independence in the home and community environment.     Patient's spiritual, cultural and educational needs considered and pt agreeable to plan of care and goals.     Anticipated barriers to physical therapy: severity of symptoms     Goals:   Short Term Goals (3 Weeks):  Ongoing 1/30/2025   Patient to report no new falls.  Patient to tolerate x 45 seconds each bilateral step stance training, eyes open to improve upright tolerance.  Patient to begin balance home exercise program.     Long Term Goals (6 Weeks):  Ongoing 1/30/2025   Patient to demonstrate competence with home exercise program to maintain therapeutic gains.  2.   Patient to ambulate 20 feet in less than 9 seconds to improve quinn/symmetry.  3.   Patient to perform floor ladder high stepping with minimal loss of  balance    Plan     Continue per POC, progressing as appropriate to achieve stated goals.    Continue with: Plan of care Certification: 1/14/2025 to 03/01/2025.     Outpatient Physical Therapy evaluation, plus 2 times weekly for 6 weeks to include the following interventions (starting week of 01/20/25): Gait Training, Manual Therapy, Moist Heat/ Ice, Neuromuscular Re-ed, Patient Education, Self Care, Therapeutic Activities, Therapeutic Exercise, and home exercise program .       Maya Augustin, PTA

## 2025-01-30 NOTE — PROGRESS NOTES
Outpatient Rehab    Speech-Language Pathology Visit    Patient Name: Eliezer Au  MRN: [id number]  YOB: 1975  Today's Date: 1/31/2025    Therapy Diagnosis: No diagnosis found.  Physician: Carolann Bagley NP    Physician Orders: Eval and Treat  Medical Diagnosis: ***    Visit # / Visits Authorized:  1 / 20   Date of Evaluation:  ***   Insurance Authorization Period: 1/23/2025 to 12/23/2025  Plan of Care Certification:  *** to ***      Time In:     Time Out:    Total Time:     Total Billable Time: ***         Subjective   Patient reports swelling in both hands; pain in her right and numbness in her left; patient reports taking medication for condition.  Pain reported as 3.    Family/caregiver present for this visit:       Objective   TIMED                  Treatment  {Treatment:07250}    Patient's spiritual, cultural, and educational needs considered and patient agreeable to plan of care and goals.     Assessment & Plan   Assessment  Patient participated well in today's session focused on education regarding WRAP (Bexma-Kgxsux-Lfqtutelx-Picture) memory strategies and functional implementation of strategies within home and other environments. Strengths noted in today's session include patient's ability to identify areas of difficulty in daily activities and when to apply WRAP strategies. Patient reports she has previously implemented memory strategy via writing down information to help with her difficulties. During session, patient set reminders in her phone for daily medication to be taken on time without assisstance from fiance. See treatment for details on patient education and participation throughout session. Patient discussed eagerness to begin using memory strategies within the home and other environments. Eliezer is progressing well towards her goals. Current goals remain appropriate. Goals to be updated as necessary. Patient prognosis is Good. Patient will continue to benefit from skilled  outpatient speech and language therapy to address the deficits listed in the problem list on initial evaluation, provide patient/family education and to maximize patient's level of independence in the home and community environment.  Evaluation/Treatment Tolerance: Patient tolerated treatment well      Plan  Continue plan of care    Goals:   Active       SLP       Short term goal 1 (Progressing)       Start:  01/24/25    Expected End:  03/21/25       Patient will recall 3/4 memory strategies independently 3 times overall and discuss how strategies are being implemented in the home and community.         Short term goal 2 (Progressing)       Start:  01/24/25    Expected End:  03/21/25       Patient will complete moderate level auditory memory tasks with 80% accuracy and minimal cues.          Short term goal 3 (Progressing)       Start:  01/24/25    Expected End:  03/21/25       Patient will complete sustained attention tasks with natural background noise with 90% accuracy independently.         Short term goal 4 (Progressing)       Start:  01/24/25    Expected End:  03/21/25       Patient will complete Goal-Plan-Action-Review with 80% accuracy independently.          Short term goal 5 (Progressing)       Start:  01/24/25    Expected End:  03/21/25       Patient will demonstrate awareness of cognitive-communication difficulties in 1-2 situations in her day in which cognitive deficits could or did compromise efficiency and performance with minimal cueing.          Long Term Goal (Progressing)       Start:  01/24/25    Expected End:  03/21/25       Patient will improve cognitive communication skills and develop appropriate compensatory strategies to improve ability to complete tasks with independence

## 2025-01-31 NOTE — PROGRESS NOTES
Patient reported pain in hands; however, verbalized it would not affect therapy session. Patient tolerated treatment well.

## 2025-02-01 NOTE — PROGRESS NOTES
Outpatient Rehab    Speech-Language Pathology Visit    Patient Name: Eliezer Au  MRN: [id number]  YOB: 1975  Today's Date: 1/31/2025    Therapy Diagnosis:   Encounter Diagnosis   Name Primary?    Memory changes Yes     Physician: Carolann Bagley, NP    Physician Orders: Eval and Treat  Medical Diagnosis: Memory changes [R41.3]     Visit # / Visits Authorized:  1 / 20   Date of Evaluation:  1/24/2024   Insurance Authorization Period: 1/23/2025 to 12/23/2025  Plan of Care Certification:  1/24/2025 to 3/21/2025       Time In:   3:15pm  Time Out:  4:00pm   Total Time:   45 minutes  Total Billable Time: 45 minutes      Subjective   Patient reports swelling in both hands; pain in her right and numbness in her left; patient reports taking medication for condition.  Pain reported as 3.       Family/caregiver present for this visit:   Fiance    Objective   Objective measures updated at progress report unless specified    Treatment    Cognitive Skills  Organization: Discussed creating reminders in her personal device for taking daily medications at the appropriate time; patient used personal device to add 2 reminders for medications (10:00am daily, 10:00pm daily).  Memory: Patient education on WRAP memory strategies (Cewkm-Jqcmew-Gigfyozrf-Picture). Patient discussed 3 situations in her daily life given visual cue (WRAP print out) on how she can incorporate WRAP strategies in her day-to-day activites.  Cognitive Skills Comments: Patient recalled difficulties in 2 situations throughout her day in which cognitive deficits did compromise her performance/efficiency. Patient recalled forgetting to take her medications without her fiancee there to remind her. Patient also recalled inability to recall measurements of ingredients when cooking meals in her home. Patient able to identify how to incorporate WRAP strategies in order to effectively take her medications in a correct manner and time as well as when she  is cooking food in her home. Patient noted liking the strategies and looking forward to using them in her day to day life to aid her memory.    Patient's spiritual, cultural, and educational needs considered and patient agreeable to plan of care and goals.     Assessment & Plan   Assessment  Patient participated well in today's session focused on education regarding WRAP (Rhjfg-Jrpbaf-Vvrzxabgg-Picture) memory strategies and functional implementation of strategies within home and other environments. Strengths noted in today's session include patient's ability to identify areas of difficulty in daily activities and when to apply WRAP strategies. Patient reports she has previously implemented memory strategy via writing down information to help with her difficulties. During session, patient set reminders in her phone for daily medication to be taken on time without assisstance from fiance. See treatment for details on patient education and participation throughout session. Patient discussed eagerness to begin using memory strategies within the home and other environments. Eliezer is progressing well towards her goals. Current goals remain appropriate. Goals to be updated as necessary. Patient prognosis is Good. Patient will continue to benefit from skilled outpatient speech and language therapy to address the deficits listed in the problem list on initial evaluation, provide patient/family education and to maximize patient's level of independence in the home and community environment.  Evaluation/Treatment Tolerance: Patient tolerated treatment well    Plan  Continue plan of care  Visit Frequency: 2 times Per Week for 8 Weeks.     This plan was discussed with Patient and Other (Comment). Fiance Discussion participants: Agreed Upon Plan of Care    Goals:   Active       SLP       Short term goal 1 (Progressing)       Start:  01/24/25    Expected End:  03/21/25       Patient will recall 3/4 memory strategies independently  3 times overall and discuss how strategies are being implemented in the home and community.         Short term goal 2 (Progressing)       Start:  01/24/25    Expected End:  03/21/25       Patient will complete moderate level auditory memory tasks with 80% accuracy and minimal cues.          Short term goal 3 (Progressing)       Start:  01/24/25    Expected End:  03/21/25       Patient will complete sustained attention tasks with natural background noise with 90% accuracy independently.         Short term goal 4 (Progressing)       Start:  01/24/25    Expected End:  03/21/25       Patient will complete Goal-Plan-Action-Review with 80% accuracy independently.          Short term goal 5 (Progressing)       Start:  01/24/25    Expected End:  03/21/25       Patient will demonstrate awareness of cognitive-communication difficulties in 1-2 situations in her day in which cognitive deficits could or did compromise efficiency and performance with minimal cueing.          Long Term Goal (Progressing)       Start:  01/24/25    Expected End:  03/21/25       Patient will improve cognitive communication skills and develop appropriate compensatory strategies to improve ability to complete tasks with independence             CHRISTIANO Powell-ANA   1/31/2025

## 2025-02-06 ENCOUNTER — CLINICAL SUPPORT (OUTPATIENT)
Dept: REHABILITATION | Facility: HOSPITAL | Age: 50
End: 2025-02-06
Payer: MEDICAID

## 2025-02-06 DIAGNOSIS — R29.898 BILATERAL LEG WEAKNESS: ICD-10-CM

## 2025-02-06 DIAGNOSIS — R26.89 IMBALANCE: ICD-10-CM

## 2025-02-06 DIAGNOSIS — R41.3 MEMORY CHANGES: Primary | ICD-10-CM

## 2025-02-06 DIAGNOSIS — R26.89 BALANCE PROBLEM: Primary | ICD-10-CM

## 2025-02-06 PROCEDURE — 92507 TX SP LANG VOICE COMM INDIV: CPT | Mod: PO

## 2025-02-06 PROCEDURE — 97112 NEUROMUSCULAR REEDUCATION: CPT | Mod: PO

## 2025-02-06 PROCEDURE — 97110 THERAPEUTIC EXERCISES: CPT | Mod: PO

## 2025-02-06 NOTE — PROGRESS NOTES
"  Outpatient Rehab    Speech-Language Pathology Visit    Patient Name: Eliezer Au  MRN: [id number]  YOB: 1975  Today's Date: 2/7/2025    Therapy Diagnosis:   Encounter Diagnosis   Name Primary?    Memory changes Yes     Physician: Carolann Bagley, NP    Physician Orders: Eval and Treat  Medical Diagnosis: Memory changes [R41.3]     Visit # / Visits Authorized:  2 / 20   Date of Evaluation:  1/24/2025    Insurance Authorization Period: 1/23/2025 to 12/31/2025  Plan of Care Expiration Date: 3/21/2025      Time In: 3:15pm  Time Out: 4:00pm  Total Time: 45 minutes     Subjective   Patient reports headache however, very dull..  Pain reported as 1/10.      Objective          Treatment  Cognitive Skills  Memory: Patient able to recall 1/4 memory strategies given moderate cues from therapist. Patient education on WRAP memory strategies. Discussed her use of her notebook brought to therapy with "to do" lists and daily activities to help her remember each day. Patient also included dates on each page.    Attention/Concentration: Patient sustained attention throughout medication management task with natural background noise and 100% accuracy. Patient able to self correct throughout task. Met x1    Patient's spiritual, cultural, and educational needs considered and patient agreeable to plan of care and goals.     Assessment & Plan   Assessment  Patient participated well in today's session focused on WRAP memory strategies and sustained attention. Patient required education on WRAP memory strategies due to inability to recall 3/4. However, at the end of the session patient wrote them in her notebook with no cues from therapist. Strengths noted in todays session include the patients ability to self-correct during the medication management task and maintain sustained attention throughout the session, with no off-task or off-topic conversations. Patient noted phone reminders created in previous session "helped " "a lot" when reminded to take her medicine. She in progressing well towards her goals. Current goals remain appropriate. Goals to be updated as necessary. Patient prognosis is Good. Patient will continue to benefit from skilled outpatient speech and language therapy to address the deficits listed in the problems list on initial evaluations, provide patient/family education and to maximize level of independence in the home and community environment.  Evaluation/Treatment Tolerance: Patient tolerated treatment well     Plan  Continue plan of care.        Goals:   Active       SLP       Short term goal 1 (Progressing)       Start:  01/24/25    Expected End:  03/21/25       Patient will recall 3/4 memory strategies independently 3 times overall and discuss how strategies are being implemented in the home and community.         Short term goal 2 (Progressing)       Start:  01/24/25    Expected End:  03/21/25       Patient will complete moderate level auditory memory tasks with 80% accuracy and minimal cues.          Short term goal 3 (Progressing)       Start:  01/24/25    Expected End:  03/21/25       Patient will complete sustained attention tasks with natural background noise with 90% accuracy independently.         Short term goal 4 (Progressing)       Start:  01/24/25    Expected End:  03/21/25       Patient will complete Goal-Plan-Action-Review with 80% accuracy independently.          Short term goal 5 (Progressing)       Start:  01/24/25    Expected End:  03/21/25       Patient will demonstrate awareness of cognitive-communication difficulties in 1-2 situations in her day in which cognitive deficits could or did compromise efficiency and performance with minimal cueing.          Long Term Goal (Progressing)       Start:  01/24/25    Expected End:  03/21/25       Patient will improve cognitive communication skills and develop appropriate compensatory strategies to improve ability to complete tasks with independence "             HOLLAND Powell  02/07/2025

## 2025-02-06 NOTE — PROGRESS NOTES
[facility]   Physical Therapy Treatment Note      Name: Eliezer FERRARA [place]  Clinic Number: [id number]    Therapy Diagnosis:   Encounter Diagnoses   Name Primary?    Balance problem Yes    Bilateral leg weakness     Imbalance      Physician: Carolann aBgley NP    Visit Date: 2/6/2025    Physician Orders: physical therapy evaluation and treat; vestibular rehab therapy   Medical Diagnosis from Referral: balance problem  Evaluation Date: 1/14/2025  Authorization Period Expiration: 12/31/2025  Plan of Care Expiration: 03/01/2025  Visit # / Visits authorized: 2/ 20     Time In: 1602  Time Out: 1646  Total Billable Time: 44 minutes     Precautions: Fall      Subjective     Patient reports: no recent falls; endorses minimal headache.    She was compliant with home exercise program.  Response to previous treatment: minimal headache   Functional change: none    Pain: 1/10  Location: left headache        Objective      Objective Measures updated at progress report unless specified.     Treatment     Eliezer received the treatments listed below:      therapeutic exercises to develop strength and endurance for 26 minutes including:     X 15 each seated bilateral lower extremity therapeutic exercise (2#) = marching, ball squeeze, long arc quad    X 5 sit to stands with bilateral upper extremity support   Functional ambulation 250 feet with grocery cart assist (20#)   X 10 minutes SciFit (level 2) as an adjunct to strength/balance/mobility training      neuromuscular re-education activities to improve: Balance for 18 minutes. The following activities were included:     X 15 each balance therapeutic exercise with single upper extremity support = heel raises/toe raises, mini squats   X 15 alternating toe taps on 3-inch stool with single upper extremity support   X 15 each balance gait = side stepping, backwards   X 1 minute stand on AirEx, eyes open with single upper extremity support   X 3 figure-8 walking  around cones (7 feet apart)      Patient Education and Home Exercises       Education provided:   - proper therapeutic exercise technique  - continue home exercise program twice a day     Written Home Exercises Provided: Pt instructed to continue prior HEP.       Assessment     Patient needed bilateral upper extremity support during sit to stands; single upper extremity support needed during balance therapeutic exercise, while standing on AirEx (eyes open) and during alternating toe taps on stool; no loss of balance noted during balance gait and during figure-8 walking.     Eliezer Is progressing fairly well towards her goals.   Patient prognosis is Fair.     Patient will continue to benefit from skilled outpatient physical therapy to address the deficits listed in the problem list box on initial evaluation, provide pt/family education and to maximize pt's level of independence in the home and community environment.     Patient's spiritual, cultural and educational needs considered and pt agreeable to plan of care and goals.     Anticipated barriers to physical therapy: severity of weakness and imbalance    Goals:     Short Term Goals (3 Weeks):   Patient to report no new falls. (MET)  Patient to tolerate x 45 seconds each bilateral step stance training, eyes open to improve upright tolerance. (NOT MET)  Patient to begin balance home exercise program. (NOT MET)     Long Term Goals (6 Weeks):   Patient to demonstrate competence with home exercise program to maintain therapeutic gains. (PART MET)  2.   Patient to ambulate 20 feet in less than 9 seconds to improve quinn/symmetry. (NOT MET)  3.   Patient to perform floor ladder high stepping with minimal loss of balance. (NOT MET)      Plan     Continue to progress strength/balance/mobility training to patient's tolerance.      Jose Martin Martini, PT

## 2025-02-17 ENCOUNTER — CLINICAL SUPPORT (OUTPATIENT)
Dept: REHABILITATION | Facility: HOSPITAL | Age: 50
End: 2025-02-17
Payer: MEDICAID

## 2025-02-17 DIAGNOSIS — R29.898 BILATERAL LEG WEAKNESS: Primary | ICD-10-CM

## 2025-02-17 DIAGNOSIS — R41.3 MEMORY CHANGES: Primary | ICD-10-CM

## 2025-02-17 DIAGNOSIS — R26.89 IMBALANCE: ICD-10-CM

## 2025-02-17 PROCEDURE — 92507 TX SP LANG VOICE COMM INDIV: CPT | Mod: PO

## 2025-02-17 PROCEDURE — 97110 THERAPEUTIC EXERCISES: CPT | Mod: PO,CQ

## 2025-02-17 NOTE — PROGRESS NOTES
[facility]   Physical Therapy Treatment Note      Name: Eliezer FERRARA [place]  Clinic Number: [id number]    Therapy Diagnosis:   Encounter Diagnoses   Name Primary?    Bilateral leg weakness Yes    Imbalance      Physician: Carolann Bagley NP    Visit Date: 2/17/2025    Physician Orders: physical therapy evaluation and treat; vestibular rehab therapy   Medical Diagnosis from Referral: balance problem  Evaluation Date: 1/14/2025  Authorization Period Expiration: 12/31/2025  Plan of Care Expiration: 03/01/2025  Visit # / Visits authorized: 4/ 20     Time In:   1603   Time Out:   1658   Total Time:   55 minutes  Total Billable Time:   55 minutes     Precautions: Fall      Subjective     Patient reports: no pain. Just getting over a cold and missed her last appt 2* sick. No recent falls.     She was limited compliant with home exercise program 2* sick  Response to previous treatment: minimal headache   Functional change: none stated     Pain: 0/10  Location:  n/a      Objective      Objective Measures updated at progress report unless specified.     Treatment     Eliezer received the treatments listed below:      therapeutic exercises to develop strength and endurance for 55 minutes including:     X 15 each seated bilateral lower extremity therapeutic exercise (2#) = marching, ball squeeze, long arc quad    NP-Functional ambulation 250 feet with grocery cart assist (20#)   X 10 minutes SciFit (level 2) as an adjunct to strength/balance/mobility training   X 15 each balance therapeutic exercise with single upper extremity support = heel raises/toe raises, mini squats-no UE support   X 15 alternating toe taps on 6-inch stool with single upper extremity support   3 laps each balance gait = side stepping, backwards   X 1 minute stand on AirEx, eyes open with single upper extremity support   X 6 figure-8 walking around cones (7 feet apart)   X 8 Sit <> stand from armchair +airex without UE support   X 2  360* turns   2 x 45s step stance each         Patient Education and Home Exercises       Education provided:   - proper therapeutic exercise technique  - continue home exercise program twice a day       Written Home Exercises Provided: Instructed patient to continue current home exercise program.   Pt demonstrated good understanding of education provided. Written and pictorial HEP of ex's in EPIC reviewed and issued to pt. Pt instructed to perform HEP daily and stop if symptoms increase.  See Electronic Medical Record under Patient Instructions for exercises provided during therapy sessions         Assessment     Limited home exercise program compliance 2* sick the last few days, but has been working on her weight shifting with squats. Paced for improved tolerance 2* endurance deficits from sickness. Progressing well with standing challenges with heavy cues for preventing right ankle roll. Continued difficulty with figure 8 walking, getting too close to nearby objects,. Educated pt that he/she may feel soreness after session.  Will benefit from continued physical therapy intervention to progress toward goals set forth in plan of care to improve functional mobility and quality of life.        Eliezer Is progressing fairly well towards her goals.   Patient prognosis is Fair.     Patient will continue to benefit from skilled outpatient physical therapy to address the deficits listed in the problem list box on initial evaluation, provide pt/family education and to maximize pt's level of independence in the home and community environment.     Patient's spiritual, cultural and educational needs considered and pt agreeable to plan of care and goals.     Anticipated barriers to physical therapy: severity of weakness and imbalance    Goals:     Short Term Goals (3 Weeks):   Patient to report no new falls. (MET)  Patient to tolerate x 45 seconds each bilateral step stance training, eyes open to improve upright tolerance. (NOT  MET)  Patient to begin balance home exercise program. (NOT MET)     Long Term Goals (6 Weeks):   Patient to demonstrate competence with home exercise program to maintain therapeutic gains. (PART MET)  2.   Patient to ambulate 20 feet in less than 9 seconds to improve quinn/symmetry. (NOT MET)  3.   Patient to perform floor ladder high stepping with minimal loss of balance. (NOT MET)      Plan     Continue to progress strength/balance/mobility training to patient's tolerance.      Maya Augustin, PTA

## 2025-02-18 NOTE — PROGRESS NOTES
Outpatient Rehab    Speech-Language Pathology Visit    Patient Name: Eliezer Au  MRN: [id number]  YOB: 1975  Today's Date: 2/18/2025    Therapy Diagnosis:   Encounter Diagnosis   Name Primary?    Memory changes Yes     Physician: Carolann Bagley, NP    Physician Orders: Eval and Treat  Medical Diagnosis: Memory changes [R41.3]     Visit # / Visits Authorized:  3 / 20   Date of Evaluation:  1/24/2025    Insurance Authorization Period: 1/23/2025 to 12/31/2025  Plan of Care Expiration Date: 3/21/2025     Time In: 3:15  Time Out: 4:00  Total Time: 45  Total Billable Time: 45         Subjective   Patient reports being sick over the weekend and slight fever this morning, but feeling much better now..  Pain reported as 1/10.        Objective          Treatment  Cognitive Skills  Memory: Patient independently recalled 3 out of 4 memory strategies. With delayed recall, patient successfully recalled all 4 strategies independently.    Attention/Concentration: Patient sustained attention throughout all tasks in session today; no off task behaviors or off-topic conversations. Met x2    Cognitive Skills Comments: Patient demonstrated awareness of cognitive-communication difficulties in two daily situations. She noted difficulty finding words in high-stress situations, such as when seeing her son. Additionally, she recognized burning rice in the microwave as a result of difficulty attending to multiple tasks at once.    Assessment & Plan   Assessment  Patient participated well in today's session focused on WRAP memory strategies, sustained attention, and cognitive-communication difficulties. Patient noted being ill over the previous days but showed up to therapy feeling better. Strengths noted in todays session include the patients use of WRAP memory strategies in the home and in her notebook as well as sustained attention throughout the session, with no off-task or off-topic conversations. Patient noted  "forgetting to take her medication before therapy and turning off "phone reminder", provided medication management printout to use for medication reminder and management. She is progressing well towards her goals. Current goals remain appropriate. Goals to be updated as necessary. Patient prognosis is Good. Patient will continue to benefit from skilled outpatient speech and language therapy to address the deficits listed in the problems list on initial evaluations, provide patient/family education and to maximize level of independence in the home and community environment.  Evaluation/Treatment Tolerance: Patient tolerated treatment well    Patient will continue to benefit from skilled outpatient speech therapy to address the deficits listed in the problem list box on initial evaluation, provide pt/family education and to maximize pt's level of independence in the home and community environment.     Patient's spiritual, cultural, and educational needs considered and patient agreeable to plan of care and goals.          Plan  Continue plan of care.          Goals:   Active       SLP       Short term goal 1 (Progressing)       Start:  01/24/25    Expected End:  03/21/25       Patient will recall 3/4 memory strategies independently 3 times overall and discuss how strategies are being implemented in the home and community.         Short term goal 2 (Progressing)       Start:  01/24/25    Expected End:  03/21/25       Patient will complete moderate level auditory memory tasks with 80% accuracy and minimal cues.          Short term goal 3 (Progressing)       Start:  01/24/25    Expected End:  03/21/25       Patient will complete sustained attention tasks with natural background noise with 90% accuracy independently.         Short term goal 4 (Progressing)       Start:  01/24/25    Expected End:  03/21/25       Patient will complete Goal-Plan-Action-Review with 80% accuracy independently.          Short term goal 5 " (Progressing)       Start:  01/24/25    Expected End:  03/21/25       Patient will demonstrate awareness of cognitive-communication difficulties in 1-2 situations in her day in which cognitive deficits could or did compromise efficiency and performance with minimal cueing.          Long Term Goal (Progressing)       Start:  01/24/25    Expected End:  03/21/25       Patient will improve cognitive communication skills and develop appropriate compensatory strategies to improve ability to complete tasks with independence             CHRISTIANO Powell-ANA  02/18/2025

## 2025-02-26 ENCOUNTER — CLINICAL SUPPORT (OUTPATIENT)
Dept: REHABILITATION | Facility: HOSPITAL | Age: 50
End: 2025-02-26
Payer: MEDICAID

## 2025-02-26 DIAGNOSIS — R41.3 MEMORY CHANGES: Primary | ICD-10-CM

## 2025-02-26 DIAGNOSIS — R26.89 BALANCE PROBLEM: Primary | ICD-10-CM

## 2025-02-26 PROCEDURE — 92507 TX SP LANG VOICE COMM INDIV: CPT | Mod: PO

## 2025-02-26 PROCEDURE — 97110 THERAPEUTIC EXERCISES: CPT | Mod: PO

## 2025-02-26 NOTE — PROGRESS NOTES
Outpatient Rehab    Speech-Language Pathology Visit    Patient Name: Eliezer Au  MRN: [id number]  YOB: 1975  Today's Date: 2/26/2025    Therapy Diagnosis:   Encounter Diagnosis   Name Primary?    Memory changes Yes       Physician: Carolann Bagley, NP    Physician Orders: Eval and Treat  Medical Diagnosis: Memory changes [R41.3]     Visit # / Visits Authorized:  4 / 20   Date of Evaluation:  1/24/2025    Insurance Authorization Period: 1/23/2025 to 12/31/2025  Plan of Care Expiration Date: 3/21/2025     Time In: 3:15  Time Out: 4:00  Total Time: 45  Total Billable Time: 45    Subjective         Patient reports no pain and showed up in good spirits.     Objective          Treatment  Cognitive Skills  Memory: Patient independently recalled 3 out of 4 memory strategies. Given delay, patient able to recall 4/4 memory strategies.     Attention/Concentration: Patient sustained attention throughout all tasks in session today; no off task behaviors or off-topic conversations. Met x3    Cognitive Skills Comments: Patient demonstrated awareness of cognitive-communication difficulties in one daily situation. Patient reported becoming overwhelmed when she could not remember how to step-by-step clean her house. Patient demonstrated understanding of using WRAP strategies, specifically, writing a list, to help her complete her tasks.     Assessment & Plan   Assessment  Patient participated well in today's session focused on WRAP memory strategies, sustained attention, and awareness of cognitive-communication difficulties. Patient reports calling  and changing frequency to one time a week. Strengths noted in todays session include the patients use of WRAP memory strategies in the home and in her notebook as well as sustained attention throughout the session, with no off-task or off-topic conversations. Patient given laminated medication management printout to use for medication reminder and  management in the home. She is progressing well towards her goals. Current goals remain appropriate. Goals to be updated as necessary. Patient prognosis is Good. Patient will continue to benefit from skilled outpatient speech and language therapy to address the deficits listed in the problems list on initial evaluations, provide patient/family education and to maximize level of independence in the home and community environment     Patient will continue to benefit from skilled outpatient speech therapy to address the deficits listed in the problem list box on initial evaluation, provide pt/family education and to maximize pt's level of independence in the home and community environment.     Patient's spiritual, cultural, and educational needs considered and patient agreeable to plan of care and goals.     Plan  Continue as established     Goals:   Active       SLP       Short term goal 1 (Progressing)       Start:  01/24/25    Expected End:  03/21/25       Patient will recall 3/4 memory strategies independently 3 times overall and discuss how strategies are being implemented in the home and community.         Short term goal 2 (Progressing)       Start:  01/24/25    Expected End:  03/21/25       Patient will complete moderate level auditory memory tasks with 80% accuracy and minimal cues.          Short term goal 3 (Progressing)       Start:  01/24/25    Expected End:  03/21/25       Patient will complete sustained attention tasks with natural background noise with 90% accuracy independently.         Short term goal 4 (Progressing)       Start:  01/24/25    Expected End:  03/21/25       Patient will complete Goal-Plan-Action-Review with 80% accuracy independently.          Short term goal 5 (Progressing)       Start:  01/24/25    Expected End:  03/21/25       Patient will demonstrate awareness of cognitive-communication difficulties in 1-2 situations in her day in which cognitive deficits could or did compromise  efficiency and performance with minimal cueing.          Long Term Goal (Progressing)       Start:  01/24/25    Expected End:  03/21/25       Patient will improve cognitive communication skills and develop appropriate compensatory strategies to improve ability to complete tasks with independence             CHRISTIANO Powell-ANA  02/26/2025

## 2025-02-26 NOTE — PROGRESS NOTES
[facility]   Physical Therapy Updated plan of care       Name: Eliezer Elmoreosh  Clinic Number: [id number]    Therapy Diagnosis:   Encounter Diagnosis   Name Primary?    Balance problem Yes     Physician: Carolann Bagley NP    Visit Date: 2/26/2025    Physician Orders: physical therapy evaluation and treat; vestibular rehab therapy   Medical Diagnosis from Referral: balance problem  Evaluation Date: 1/14/2025  Authorization Period Expiration: 12/31/2025  Plan of Care Expiration: 03/01/2025  Visit # / Visits authorized: 4/ 20     Time In: 1602  Time Out: 1642  Total Billable Time: 40 minutes     Precautions: Fall  Functional Level Prior to Evaluation:  supervision needed      Subjective     Patient reports: that she took a fall a few days ago - tripped over one of her slippers; now reports minimal discomfort in her right wrist.    She was compliant with home exercise program.  Response to previous treatment: new onset right wrist pain   Functional change: none    Pain: 3/10  Location: right wrist        Objective      Functional Gait Assessment   1. Gait on level surface =  1  2. Change in Gait Speed = 1  3. Gait with horizontal head turns  = 2  4. Gait with vertical head turns = 2  5. Gait with pivot turns = 1  6. Step over obstacle = 1  7. Gait with Narrow VAIBHAV = 2  8. Gait with eyes closed = 1  9. Ambulating Backwards = 1  10. Steps = 1     Score 13/30   Score:   <22/30 fall risk   <20/30 fall risk in older adults   <18/30 fall risk in Parkinsons      Treatment     Eliezer received the treatments listed below:      neuromuscular re-education activities to improve: Balance for 40 minutes. The following activities were included:     X 45 seconds each bilateral step stance training   2 x 15 feet floor ladder high stepping* = forwards  Functional ambulation 2 x 20 feet at normal speed (avg 9.46 seconds)  2 X 20 feet ambulation with changing speeds (slow and fast)**  X 20 feet ambulation with  head turns*  X 20 feet ambulation with head nods*  X 20 feet ambulation with pivot turns**  X 20 feet ambulation with stepping over obstacle (4 1/2 inch*)  X 15 feet ambulation heel-toe*  X 20 feet ambulation with eyes closed**  X 20 feet ambulation backwards**  2 x 4 up/down 6-inch stairs - step to gait and with use of hand rails        Patient Education and Home Exercises       Education provided:   - proper therapeutic exercise technique  - continue home exercise program twice a day     Written Home Exercises Provided: Pt instructed to continue prior HEP.       Assessment     Patient was able to perform bilateral step stance training without loss of balance; occasional loss of balance noted during floor ladder high stepping; only minor adjustments made when asked to change walking speed; minimal dysequilibrium noted during gait with head turns and head nods; patient turned slowly during pivot turns and needed to slow down before stepping over obstacle; over 10 steps performed during heel-toe gait but staggered often; moderate path deviation noted during gait with eyes closed; abnormal gait pattern noted during backwards gait; elevated fall risk as evidenced by score of 13/30 during Functional Gait Assessment (<22/30 fall risk).     Eliezer Is progressing fairly well towards her goals.   Patient prognosis is Fair.     Patient will continue to benefit from skilled outpatient physical therapy to address the deficits listed in the problem list box on initial evaluation, provide pt/family education and to maximize pt's level of independence in the home and community environment.     Patient's spiritual, cultural and educational needs considered and pt agreeable to plan of care and goals.     Anticipated barriers to physical therapy: severity of weakness and imbalance    Goals:     Previous Short Term Goals (3 Weeks):   Patient to report no new falls. (NOT MET)  Patient to tolerate x 45 seconds each bilateral step stance  training, eyes open to improve upright tolerance. (PART MET)  Patient to begin balance home exercise program. (NOT MET)     Previous Long Term Goals (6 Weeks):   Patient to demonstrate competence with home exercise program to maintain therapeutic gains. (PART MET)  2.   Patient to ambulate 20 feet in less than 9 seconds to improve quinn/symmetry. (NOT MET)  3.   Patient to perform floor ladder high stepping with minimal loss of balance. (MET)    New Short Term Goals (4 Weeks):   Patient to report no new falls since assessment on 02/27/25.  Patient to tolerate x 45 seconds modified single leg stance training to improve upright tolerance.  Patient to begin balance home exercise program.     New Long Term Goals (8 Weeks):   Patient to demonstrate competence with home exercise program to maintain therapeutic gains.  2.   Patient to ambulate 20 feet in less than 9 seconds to improve quinn/symmetry.  3.   Patient to perform gait with eyes closed and minimal path deviation.    Reasons for Recertification of Therapy:   Patient would benefit from further physical therapy visits to continue addressing strength/balance/mobility deficits.      Plan     Updated Certification Period: 02/26/2025 to 04/26/2025   Recommended Treatment Plan: 1 times per week for 8 weeks (starting week of 03/03/2025):  Gait Training, Manual Therapy, Moist Heat/ Ice, Neuromuscular Re-ed, Patient Education, Self Care, Therapeutic Activities, Therapeutic Exercise, and home exercise program  Other Recommendations: n/a    Jose Martin Martini, PT

## 2025-03-27 ENCOUNTER — CLINICAL SUPPORT (OUTPATIENT)
Dept: REHABILITATION | Facility: HOSPITAL | Age: 50
End: 2025-03-27
Payer: MEDICAID

## 2025-03-27 DIAGNOSIS — R29.898 BILATERAL LEG WEAKNESS: Primary | ICD-10-CM

## 2025-03-27 DIAGNOSIS — R26.89 IMBALANCE: ICD-10-CM

## 2025-03-27 PROCEDURE — 97110 THERAPEUTIC EXERCISES: CPT | Mod: PO,CQ

## 2025-03-27 NOTE — PROGRESS NOTES
[facility]   Physical Therapy Treatment Note      Name: Eliezer FERRARA Kenova  Clinic Number: [id number]    Therapy Diagnosis:   Encounter Diagnoses   Name Primary?    Bilateral leg weakness Yes    Imbalance        Physician: Carolann Bagley NP    Visit Date: 3/27/2025    Physician Orders: physical therapy evaluation and treat; vestibular rehab therapy   Medical Diagnosis from Referral: balance problem  Evaluation Date: 1/14/2025  Authorization Period Expiration: 12/31/2025  Plan of Care Expiration: 03/01/2025  Visit # / Visits authorized: 5/ 20     Time In:   1621  Time Out:  1700  Total Time:   39  Total Billable Time: 39 minutes  CHARGES BASED ON 1-1 TX     Precautions: Fall      Subjective     Patient reports: late start 2*  error. No complaints. Has been working on her  balance training at home and feeling more confident. Had a cold and missed some appts.     She was compliant with home exercise program.  Response to previous treatment: minimal headache   Functional change: none stated     Pain: 0/10  Location:  n/a      Objective      Objective Measures updated at progress report unless specified.     Treatment     Eliezer received the treatments listed below:    CHARGES BASED ON 1-1 TX:  therapeutic exercises to develop strength and endurance for 39 minutes including:     NP-X 45 seconds each bilateral step stance training              4 x 15 feet floor ladder high stepping* = forwards  NP-Functional ambulation 2 x 20 feet at normal speed (avg 9.46 seconds)  NP-2 X 20 feet ambulation with changing speeds (slow and fast)**  X 20 feet ambulation with head nods*  X 20 feet ambulation with pivot turns**  NP-X 20 feet ambulation with stepping over obstacle (4 1/2 inch*)  X 15 feet ambulation heel-toe*  X 20 feet ambulation with eyes closed*  X 20 feet ambulation backwards*  3 x 4 up/down 6-inch stairs - step to gait and with use of hand rails  Square dancing around mat: Tandem,  sidestepping left/right, and backward walking x 2 laps SBA         Patient Education and Home Exercises       Education provided:   - proper therapeutic exercise technique  - continue home exercise program twice a day       Written Home Exercises Provided: Instructed patient to continue current home exercise program.   Pt demonstrated good understanding of education provided. Written and pictorial HEP of ex's in EPIC reviewed and issued to pt. Pt instructed to perform HEP daily and stop if symptoms increase.  See Electronic Medical Record under Patient Instructions for exercises provided during therapy sessions         Assessment     Initial apprehension with higher level balance and gait activities which improved with repetition. Emphasis on hip extension during gait to allow more fluidity during stance to toe off, and gait improved after session. Improving performance today. Will benefit from continued physical therapy intervention to progress toward goals set forth in plan of care to improve functional mobility and quality of life.        Eliezer Is progressing fairly well towards her goals.   Patient prognosis is Fair.     Patient will continue to benefit from skilled outpatient physical therapy to address the deficits listed in the problem list box on initial evaluation, provide pt/family education and to maximize pt's level of independence in the home and community environment.     Patient's spiritual, cultural and educational needs considered and pt agreeable to plan of care and goals.     Anticipated barriers to physical therapy: severity of weakness and imbalance    Goals:     Short Term Goals (3 Weeks):   Patient to report no new falls. (MET)  Patient to tolerate x 45 seconds each bilateral step stance training, eyes open to improve upright tolerance. (NOT MET)  Patient to begin balance home exercise program. (NOT MET)     Long Term Goals (6 Weeks):   Patient to demonstrate competence with home exercise  program to maintain therapeutic gains. (PART MET)  2.   Patient to ambulate 20 feet in less than 9 seconds to improve quinn/symmetry. (NOT MET)  3.   Patient to perform floor ladder high stepping with minimal loss of balance. (NOT MET)      Plan     Continue to progress strength/balance/mobility training to patient's tolerance.      Maya Augustin, PTA

## 2025-04-09 ENCOUNTER — CLINICAL SUPPORT (OUTPATIENT)
Dept: REHABILITATION | Facility: HOSPITAL | Age: 50
End: 2025-04-09
Payer: MEDICAID

## 2025-04-09 DIAGNOSIS — R29.898 BILATERAL LEG WEAKNESS: ICD-10-CM

## 2025-04-09 DIAGNOSIS — R26.89 IMBALANCE: ICD-10-CM

## 2025-04-09 DIAGNOSIS — R41.3 MEMORY CHANGES: Primary | ICD-10-CM

## 2025-04-09 DIAGNOSIS — R26.89 BALANCE PROBLEM: Primary | ICD-10-CM

## 2025-04-09 PROCEDURE — 92507 TX SP LANG VOICE COMM INDIV: CPT | Mod: PO

## 2025-04-09 PROCEDURE — 97110 THERAPEUTIC EXERCISES: CPT | Mod: PO

## 2025-04-09 NOTE — PROGRESS NOTES
[facility]   Physical Therapy Progress Note      Name: Eliezer Au  Clinic Number: [id number]    Therapy Diagnosis:   Encounter Diagnoses   Name Primary?    Balance problem Yes    Bilateral leg weakness     Imbalance      Physician: Carolann Bagley NP    Visit Date: 4/9/2025    Physician Orders: physical therapy evaluation and treat; vestibular rehab therapy   Medical Diagnosis from Referral: balance problem  Evaluation Date: 1/14/2025  Authorization Period Expiration: 12/31/2025  Plan of Care Expiration: 04/26/2025  Visit # / Visits authorized: 6/ 20     Time In: 1649  Time Out: 1730  Total Billable Time: 41 minutes     Precautions: Fall      Subjective     Patient reports: no recent falls; endorses neuropathy pain in bilateral lower extremities.    She was compliant with home exercise program.  Response to previous treatment: elevated lower extremity discomfort   Functional change: none    Pain: 4-5/10  Location: bilateral lower extremity neuropathy        Objective      TINETTI BALANCE ASSESSMENT TOOL     Savageetti ME, Raj TF, Cody R, Fall Risk Index for elderly patients based on number of chronic disabilities.Am J Med [id number]        BALANCE SECTION  Patient is seated in hard, armless chair;     1.Sitting Balance:   Steady; safe = 1  2.Rises from chair :  Able, without using arms = 1  3.Attempts to arise :  Able, requirese > 1 attempt = 2  4.Immediate standing Balance (first 5 seconds) : Steady without walker or other support = 2  5.Standing balance: Narrow stance without support = 2  6.Nudged : Staggers, grabs, catches self = 1  7.Eyes closed: Unsteady = 0  8.Turning 360 degrees:  Discontinuous steps = 0 and Unsteady (grabs, staggers) = 0  9.Sitting Down : Uses arms or not a smooth motion = 1     Balance Score:  10/16     GAIT SECTION  Patient stands with therapist, walks across room (+/- aids), first at usual pace, then at rapid pace.     10.Initiation of Gait  (Immediately after told to go.): No hesitancy = 1  11.Step length and height :  Step through R=1 and Step through L=1  12.Foot Clearance :  L foot clears floor=1; R foot clears floor=1  13.Step symmetry: Right & Left step length appear equal = 1  14.Step continuity : Steps appear continuous = 1  15.Path: Mild/moderate deviation or uses walking aid = 1  16.Trunk : No sway, but flexion of knees or back or spreads arm out while walking = 1  17.Walking Time: Heels apart = 0     Gait Score: 9/12  Balance score:10/16  Total Score=Balance + Gait Score: 19/28     Risk Indicators:     Tinetti Tool Score                 Risk of Falls              <=18                              High              19-23                           Moderate              >=24                              Low        Treatment     Eliezer received the treatments listed below:      therapeutic exercises to develop strength and endurance for 16 minutes including:     X 20 each seated bilateral lower extremity therapeutic exercise (2#) = marching, ball squeeze, long arc quad    X 10 minutes SciFit (level 2.2) as an adjunct to strength/balance/mobility training      neuromuscular re-education activities to improve: Balance for 25 minutes. The following activities were included:    X 10 static standing holds  X 10 seconds stand with eyes closed  X 1 each turn 360 degrees in place = clockwise/counterclockwise  Functional ambulation 120 feet with stand by assist    X 20 each balance therapeutic exercise with single upper extremity support = heel raises/toe raises, mini squats  X 25 feet each balance gait = side stepping, backwards*   X 20 alternating toe taps on 3-inch stool with single upper extremity support    X 1 minute stand on AirEx, eyes open with occasional single upper extremity support*      *minimal difficulty       Patient Education and Home Exercises       Education provided:   - proper therapeutic exercise technique  - continue home exercise  program twice a day     Written Home Exercises Provided: Pt instructed to continue prior HEP.       Assessment     Patient was able to perform increased repetitions during seated bilateral lower extremity therapeutic exercise; increased repetitions performed during alternating toe taps and during balance therapeutic exercise - single upper extremity support needed for both; longer distance performed during balance gait - minimal dysequilibrium noted during backwards gait; increased resistance tolerated on SciFit; Tinetti balance score improved from 17/28 to 19/28.      Eliezer Is progressing fairly well towards her goals.   Patient prognosis is Fair.     Patient will continue to benefit from skilled outpatient physical therapy to address the deficits listed in the problem list box on initial evaluation, provide pt/family education and to maximize pt's level of independence in the home and community environment.     Patient's spiritual, cultural and educational needs considered and pt agreeable to plan of care and goals.     Anticipated barriers to physical therapy: severity of weakness and imbalance    Goals:     New Short Term Goals (4 Weeks):   Patient to report no new falls since assessment on 02/27/25. (MET)  Patient to tolerate x 45 seconds modified single leg stance training to improve upright tolerance. (NOT MET)  Patient to begin balance home exercise program. (NOT MET)     New Long Term Goals (8 Weeks):   Patient to demonstrate competence with home exercise program to maintain therapeutic gains. (NOT MET)  2.   Patient to ambulate 20 feet in less than 9 seconds to improve quinn/symmetry. (NOT MET)  3.   Patient to perform gait with eyes closed and minimal path deviation. (NOT MET)      Plan     Continue to progress strength/balance/mobility training to patient's tolerance.      Jose Martin Martini, PT      ..........................

## 2025-04-10 NOTE — PROGRESS NOTES
Outpatient Rehab    Speech-Language Pathology Progress Note : Updated Plan of Care    Patient Name: Eliezer Au  MRN: [id number]  YOB: 1975  Encounter Date: 4/9/2025    Therapy Diagnosis:   Encounter Diagnosis   Name Primary?    Memory changes Yes     Physician: Craolann Bagley NP    Physician Orders: Eval and Treat  Medical Diagnosis: Memory changes    Visit # / Visits Authorized: 5 / 20   Insurance Authorization Period: 1/23/2025 to 12/31/2025  Date of Evaluation:  1/24/2025   Plan of Care Certification: 4/9/2025 to 5/21/2025     Time In: 1515   Time Out: 1600  Total Time: 45   Total Billable Time: 45    Subjective   Patient reports having miscommunication between  and herself for schedule, causing her to miss a month's worth of therapy.  Pain reported as 0/10.    Family/caregiver present for this visit:   Kwan    Objective            Treatment  Cognitive Skills  Activity 1: Patient recalled 2 out of 4 WRAP memory strategies this date. Patient reports she has been writing things down to help with her recall of important information. Given a delay, patient able to recall 4/4 WRAP memory strategies  Activity 2: Reports having difficulty with her medication, discussed using laminated medication sheet given to her to help with her recall as well as her reminders set on her phone  Activity 3: Patient reported having difficulty initiating and completing tasks in the home. GPDR given and explained to patient to help her complete ADLs in the home    Time Entry(in minutes):  Speech Treatment (Individual) Time Entry: 45    Assessment & Plan   Assessment   Eliezer presents with symptoms that are Stable.    Personal Factors Affecting Prognosis: Transportation, Schedule      Evaluation/Treatment Response: Patient responded to treatment well       Patient Goal for Therapy (SLP): To become more independent in daily tasks    Prognosis: Good      Assessment Details: Patient participated well in  today's session focused on WRAP memory strategies, Goal Plan Action Review(GPAR), and updating plan of care. Patient was not scheduled for speech therapy services for approximately 4 weeks, causing her plan of care to . Time spent this date discussing updated plan of care. Patient was able to recall WRAP strategies and how they were functionally being used in the home and other environments. Reports difficulty managing and taking medications. Discussed use of medication management sheet provided in previous session. Due to a gap in services, existing goals will be carried over into the updated plan of care. Goals remain appropriate and will be updated as needed.    Plan  From a speech language pathology perspective, the patient would benefit from: Skilled Rehab Services  Planned therapy interventions and modalities include: Cognitive therapy.        Visit Frequency: 1 times Per Week for 6 Weeks.     This plan was discussed with Patient and Family.   Discussion participants: Agreed Upon Plan of Care  Plan details: Updated plan of care this date. Continue as established    Patient will continue to benefit from skilled outpatient speech therapy to address the deficits listed in the problem list box on initial evaluation, provide pt/family education and to maximize pt's level of independence in the home and community environment.     Patient's spiritual, cultural, and educational needs considered and patient agreeable to plan of care and goals.     Goals:   Active       SLP       Short term goal 1 (Progressing)       Start:  25    Expected End:  25       Patient will recall 3/4 memory strategies independently 3 times overall and discuss how strategies are being implemented in the home and community.         Short term goal 2 (Progressing)       Start:  25    Expected End:  25       Patient will complete moderate level auditory memory tasks with 80% accuracy and minimal cues.          Short  term goal 3 (Progressing)       Start:  01/24/25    Expected End:  05/21/25       Patient will complete sustained attention tasks with natural background noise with 90% accuracy independently.         Short term goal 4 (Progressing)       Start:  01/24/25    Expected End:  05/21/25       Patient will complete Goal-Plan-Action-Review with 80% accuracy independently.          Short term goal 5 (Progressing)       Start:  01/24/25    Expected End:  05/21/25       Patient will demonstrate awareness of cognitive-communication difficulties in 1-2 situations in her day in which cognitive deficits could or did compromise efficiency and performance with minimal cueing.          Long Term Goal (Progressing)       Start:  01/24/25    Expected End:  05/21/25       Patient will improve cognitive communication skills and develop appropriate compensatory strategies to improve ability to complete tasks with independence             CHRISTIANO Powell-SLP

## 2025-04-16 DIAGNOSIS — R29.6 FALLS: ICD-10-CM

## 2025-04-16 DIAGNOSIS — R20.0 ANESTHESIA OF SKIN: ICD-10-CM

## 2025-04-16 DIAGNOSIS — R26.89 BALANCE PROBLEM: Primary | ICD-10-CM

## 2025-05-06 ENCOUNTER — CLINICAL SUPPORT (OUTPATIENT)
Dept: REHABILITATION | Facility: HOSPITAL | Age: 50
End: 2025-05-06
Payer: MEDICAID

## 2025-05-06 DIAGNOSIS — R26.89 IMBALANCE: ICD-10-CM

## 2025-05-06 DIAGNOSIS — R26.89 BALANCE PROBLEM: Primary | ICD-10-CM

## 2025-05-06 DIAGNOSIS — R41.3 MEMORY CHANGES: Primary | ICD-10-CM

## 2025-05-06 DIAGNOSIS — R29.898 BILATERAL LEG WEAKNESS: ICD-10-CM

## 2025-05-06 PROCEDURE — 92507 TX SP LANG VOICE COMM INDIV: CPT | Mod: PO

## 2025-05-06 PROCEDURE — 97110 THERAPEUTIC EXERCISES: CPT | Mod: PO

## 2025-05-06 NOTE — PROGRESS NOTES
[facility]   Physical Therapy Updated plan of care       Name: Eliezer Raymundontosh  Clinic Number: [id number]    Therapy Diagnosis:   Encounter Diagnoses   Name Primary?    Balance problem Yes    Bilateral leg weakness     Imbalance      Physician: Carolann Bagley NP    Visit Date: 5/6/2025    Physician Orders: physical therapy evaluation and treat; vestibular rehab therapy   Medical Diagnosis from Referral: balance problem  Evaluation Date: 1/14/2025  Authorization Period Expiration: 12/31/2025  Plan of Care Expiration: 04/26/2025  Visit # / Visits authorized: 7/ 20     Time In: 1648  Time Out: 1728  Total Billable Time: 40 minutes     Precautions: Fall  Functional Level Prior to Evaluation:  supervision needed      Subjective     Patient reports: no falls since last re-assessment.    She was compliant with home exercise program.  Response to previous treatment: improved stability  Functional change: none    Pain: no complaints of pain      Objective      Functional Gait Assessment   1. Gait on level surface =  2  2. Change in Gait Speed = 3  3. Gait with horizontal head turns  = 2  4. Gait with vertical head turns = 1  5. Gait with pivot turns = 3  6. Step over obstacle = 2  7. Gait with Narrow VAIBHAV = 1  8. Gait with eyes closed = 1  9. Ambulating Backwards = 2  10. Steps = 1     Score 18/30   Score:   <22/30 fall risk   <20/30 fall risk in older adults   <18/30 fall risk in Parkinsons      Treatment     Eliezer received the treatments listed below:      neuromuscular re-education activities to improve: Balance for 40 minutes. The following activities were included:     X 45 seconds each bilateral modified single leg stance training with contralateral foot on 5-inch stool*/*  Functional ambulation 2 x 20 feet at normal speed (avg 5.87 seconds)  2 X 20 feet ambulation with changing speeds (slow and fast)  X 20 feet ambulation with head turns*  X 20 feet ambulation with head nods**  X 20 feet  ambulation with pivot turns  X 20 feet ambulation with stepping over obstacle (4 1/2 inch and 9 inch*)  3 X 4-7 feet ambulation heel-toe**  X 20 feet ambulation with eyes closed**  X 20 feet ambulation backwards*  2 x 4 up/down 6-inch stairs - step to gait and without use of hand rails        Patient Education and Home Exercises       Education provided:   - proper therapeutic exercise technique  - continue home exercise program twice a day     Written Home Exercises Provided: Pt instructed to continue prior HEP.       Assessment     Patient demonstrated minimal sway during modified single leg stance training; minimal dysequilibrium noted during gait with head turns - moderate noted during gait with head nods; patient needed to slow down before stepping over taller obstacle; occasional loss of balance demonstrated during heel-toe gait; moderate path deviation noted during gait with eyes closed; decreased velocity demonstrated during backwards gait; improved fall risk as evidenced by score of 18/30 during Functional Gait Assessment (13/30 at last assessment).     Eliezer Is progressing fairly well towards her goals.   Patient prognosis is Fair.     Patient will continue to benefit from skilled outpatient physical therapy to address the deficits listed in the problem list box on initial evaluation, provide pt/family education and to maximize pt's level of independence in the home and community environment.     Patient's spiritual, cultural and educational needs considered and pt agreeable to plan of care and goals.     Anticipated barriers to physical therapy: severity of weakness and imbalance    Goals:     Previous Short Term Goals (4 Weeks):   Patient to report no new falls since assessment on 02/27/25. (MET)  Patient to tolerate x 45 seconds modified single leg stance training to improve upright tolerance. (NOT MET)  Patient to begin balance home exercise program. (NOT MET)     Previous Long Term Goals (8 Weeks):    Patient to demonstrate competence with home exercise program to maintain therapeutic gains. (NOT MET)  2.   Patient to ambulate 20 feet in less than 9 seconds to improve quinn/symmetry. (NOT MET)  3.   Patient to perform gait with eyes closed and minimal path deviation. (NOT MET)    New Short Term Goals (4 Weeks):   Patient to report no new falls since assessment on 05/06/25.  Patient to tolerate x 45 seconds full Romberg training, eyes open to improve upright tolerance.  Patient to begin balance home exercise program.     New Long Term Goals (8 Weeks):   Patient to demonstrate competence with home exercise program to maintain therapeutic gains.  2.   Patient to ambulate 20 feet in less than 5.75 seconds to improve quinn/symmetry.  3.   Patient to perform gait with eyes closed and minimal path deviation.    Reasons for Recertification of Therapy:   Patient would benefit from further physical therapy visits to continue addressing strength/balance/mobility deficits.      Plan     Updated Certification Period: 05/06/2025 to 06/28/2025   Recommended Treatment Plan: 1 times per week for 8 weeks:  Gait Training, Manual Therapy, Moist Heat/ Ice, Neuromuscular Re-ed, Patient Education, Self Care, Therapeutic Activities, Therapeutic Exercise, and home exercise program  Other Recommendations: n/a    Jose Martin Martini, PT

## 2025-05-07 NOTE — PROGRESS NOTES
Outpatient Rehab    Physical Therapy Progress Note : Updated Plan of Care        Name: Eliezer Au  Clinic Number: [id number]     Therapy Diagnosis:        Encounter Diagnoses   Name Primary?    Balance problem Yes    Bilateral leg weakness      Imbalance        Physician: Carolann Bagley NP     Visit Date: 5/6/2025     Physician Orders: physical therapy evaluation and treat; vestibular rehab therapy   Medical Diagnosis from Referral: balance problem  Evaluation Date: 1/14/2025  Authorization Period Expiration: 12/31/2025  Plan of Care Expiration: 04/26/2025  Visit # / Visits authorized: 7/ 20     Time In: 1648  Time Out: 1728  Total Billable Time: 40 minutes     Precautions: Fall  Functional Level Prior to Evaluation:  supervision needed        Subjective      Patient reports: no falls since last re-assessment.     She was compliant with home exercise program.  Response to previous treatment: improved stability  Functional change: none     Pain: no complaints of pain        Objective       Functional Gait Assessment   1. Gait on level surface =  2  2. Change in Gait Speed = 3  3. Gait with horizontal head turns  = 2  4. Gait with vertical head turns = 1  5. Gait with pivot turns = 3  6. Step over obstacle = 2  7. Gait with Narrow VAIBHAV = 1  8. Gait with eyes closed = 1  9. Ambulating Backwards = 2  10. Steps = 1     Score 18/30   Score:   <22/30 fall risk   <20/30 fall risk in older adults   <18/30 fall risk in Parkinsons        Treatment      Eliezer received the treatments listed below:       neuromuscular re-education activities to improve: Balance for 40 minutes. The following activities were included:                 X 45 seconds each bilateral modified single leg stance training with contralateral foot on 5-inch stool*/*  Functional ambulation 2 x 20 feet at normal speed (avg 5.87 seconds)  2 X 20 feet ambulation with changing speeds (slow and fast)  X 20 feet ambulation with head turns*  X 20 feet  ambulation with head nods**  X 20 feet ambulation with pivot turns  X 20 feet ambulation with stepping over obstacle (4 1/2 inch and 9 inch*)  3 X 4-7 feet ambulation heel-toe**  X 20 feet ambulation with eyes closed**  X 20 feet ambulation backwards*  2 x 4 up/down 6-inch stairs - step to gait and without use of hand rails           Patient Education and Home Exercises        Education provided:   - proper therapeutic exercise technique  - continue home exercise program twice a day      Written Home Exercises Provided: Pt instructed to continue prior HEP.         Assessment      Patient demonstrated minimal sway during modified single leg stance training; minimal dysequilibrium noted during gait with head turns - moderate noted during gait with head nods; patient needed to slow down before stepping over taller obstacle; occasional loss of balance demonstrated during heel-toe gait; moderate path deviation noted during gait with eyes closed; decreased velocity demonstrated during backwards gait; improved fall risk as evidenced by score of 18/30 during Functional Gait Assessment (13/30 at last assessment).      Eliezer Is progressing fairly well towards her goals.   Patient prognosis is Fair.      Patient will continue to benefit from skilled outpatient physical therapy to address the deficits listed in the problem list box on initial evaluation, provide pt/family education and to maximize pt's level of independence in the home and community environment.      Patient's spiritual, cultural and educational needs considered and pt agreeable to plan of care and goals.     Anticipated barriers to physical therapy: severity of weakness and imbalance     Goals:      Previous Short Term Goals (4 Weeks):   Patient to report no new falls since assessment on 02/27/25. (MET)  Patient to tolerate x 45 seconds modified single leg stance training to improve upright tolerance. (NOT MET)  Patient to begin balance home exercise program.  (NOT MET)     Previous Long Term Goals (8 Weeks):   Patient to demonstrate competence with home exercise program to maintain therapeutic gains. (NOT MET)  2.   Patient to ambulate 20 feet in less than 9 seconds to improve quinn/symmetry. (NOT MET)  3.   Patient to perform gait with eyes closed and minimal path deviation. (NOT MET)     New Short Term Goals (4 Weeks):   Patient to report no new falls since assessment on 05/06/25.  Patient to tolerate x 45 seconds full Romberg training, eyes open to improve upright tolerance.  Patient to begin balance home exercise program.     New Long Term Goals (8 Weeks):   Patient to demonstrate competence with home exercise program to maintain therapeutic gains.  2.   Patient to ambulate 20 feet in less than 5.75 seconds to improve quinn/symmetry.  3.   Patient to perform gait with eyes closed and minimal path deviation.     Reasons for Recertification of Therapy:   Patient would benefit from further physical therapy visits to continue addressing strength/balance/mobility deficits.        Plan      Updated Certification Period: 05/06/2025 to 06/28/2025   Recommended Treatment Plan: 1 times per week for 8 weeks:  Gait Training, Manual Therapy, Moist Heat/ Ice, Neuromuscular Re-ed, Patient Education, Self Care, Therapeutic Activities, Therapeutic Exercise, and home exercise program  Other Recommendations: n/a     Jose Martin Martini, PT

## 2025-05-07 NOTE — PROGRESS NOTES
Outpatient Rehab    Speech-Language Pathology Visit    Patient Name: Eliezer Au  MRN: [id number]  YOB: 1975  Encounter Date: 5/6/2025    Therapy Diagnosis:   Encounter Diagnosis   Name Primary?    Memory changes Yes     Physician: Carolann Bagley, NP    Physician Orders: Eval and Treat  Medical Diagnosis: Memory changes    Visit # / Visits Authorized: 6 / 20   Insurance Authorization Period: 1/23/2025 to 12/31/2025  Date of Evaluation:  1/24/2025   Plan of Care Certification: 4/9/2025 to 5/21/2025     Time In: 1550   Time Out: 1635  Total Time (in minutes): 45   Total Billable Time (in minutes): 45      Subjective   Pt arrived on time and in pleasant spirits.           Objective            Treatment  Cognitive Skills  Activity 1: Pt recalled 4/4 WRAP memory strategies this date. Patient reports using her calendar at home to keep track of upcoming appointments as well as upcoming events. Met x1  Activity 2: Patient reports no issues with medication adherence when using the laminated medication sheet created in previous sessions.  Activity 3: Patient reported not using GPDR; however, he has independently started and completed his laundry over the past couple of weeks without difficulty.  Activity 4: Patient reports creating a 2-3 item task list each day and completing the tasks without difficulty.    Time Entry(in minutes):  Speech Treatment (Individual) Time Entry: 45    Assessment & Plan   Assessment  Patient participated well in todays session, which focused on WRAP memory strategies and prioritization. The patient reports significant changes in her daily routine over the past few weeks and endorses consistent use of strategies learned in therapy, including maintaining a structured routine. She reports that these changes have been helpful and that she has experienced noticeable improvement with minimal difficulties compared to the past. We discussed the importance of continuing to maintain a  structured routine and task lists to support initiation and completion of home tasks. Pt also reports complete independence in taking her medications with the use of reminders. Improvement noted in ability to organize thoughts and overall speech fluency. Current goals remain appropriate and will be updated as needed  Evaluation/Treatment Tolerance: Patient tolerated treatment well    Patient will continue to benefit from skilled outpatient speech therapy to address the deficits listed in the problem list box on initial evaluation, provide pt/family education and to maximize pt's level of independence in the home and community environment.     Patient's spiritual, cultural, and educational needs considered and patient agreeable to plan of care and goals.          Plan  Continue ST POC          Goals:   Active       SLP       Short term goal 1 (Progressing)       Start:  01/24/25    Expected End:  05/21/25       Patient will recall 3/4 memory strategies independently 3 times overall and discuss how strategies are being implemented in the home and community.         Short term goal 2 (Progressing)       Start:  01/24/25    Expected End:  05/21/25       Patient will complete moderate level auditory memory tasks with 80% accuracy and minimal cues.          Short term goal 3 (Progressing)       Start:  01/24/25    Expected End:  05/21/25       Patient will complete sustained attention tasks with natural background noise with 90% accuracy independently.         Short term goal 4 (Progressing)       Start:  01/24/25    Expected End:  05/21/25       Patient will complete Goal-Plan-Action-Review with 80% accuracy independently.          Short term goal 5 (Progressing)       Start:  01/24/25    Expected End:  05/21/25       Patient will demonstrate awareness of cognitive-communication difficulties in 1-2 situations in her day in which cognitive deficits could or did compromise efficiency and performance with minimal cueing.           Long Term Goal (Progressing)       Start:  01/24/25    Expected End:  05/21/25       Patient will improve cognitive communication skills and develop appropriate compensatory strategies to improve ability to complete tasks with independence             CHRISTIANO Powell-SLP

## 2025-05-20 ENCOUNTER — CLINICAL SUPPORT (OUTPATIENT)
Dept: REHABILITATION | Facility: HOSPITAL | Age: 50
End: 2025-05-20
Payer: MEDICAID

## 2025-05-20 DIAGNOSIS — R29.898 BILATERAL LEG WEAKNESS: ICD-10-CM

## 2025-05-20 DIAGNOSIS — R26.89 IMBALANCE: ICD-10-CM

## 2025-05-20 DIAGNOSIS — R26.89 BALANCE PROBLEM: Primary | ICD-10-CM

## 2025-05-20 DIAGNOSIS — R41.3 MEMORY CHANGES: Primary | ICD-10-CM

## 2025-05-20 PROCEDURE — 92507 TX SP LANG VOICE COMM INDIV: CPT | Mod: PO

## 2025-05-20 PROCEDURE — 97110 THERAPEUTIC EXERCISES: CPT | Mod: PO

## 2025-05-20 NOTE — PROGRESS NOTES
[facility]   Physical Therapy Treatment Note      Name: Eliezer FERRARA [place]  Clinic Number: [id number]    Therapy Diagnosis:   Encounter Diagnoses   Name Primary?    Balance problem Yes    Bilateral leg weakness     Imbalance      Physician: Carolann Balgey NP    Visit Date: 5/20/2025    Physician Orders: physical therapy evaluation and treat; vestibular rehab therapy   Medical Diagnosis from Referral: balance problem  Evaluation Date: 1/14/2025  Authorization Period Expiration: 12/31/2025  Plan of Care Expiration: 06/28/2025  Visit # / Visits authorized: 8/ 20     Time In: 1649  Time Out: 1730  Total Billable Time: 41 minutes     Precautions: Fall      Subjective     Patient reports: no recent falls; endorses neuropathy pain in bilateral upper/lower extremities.    She was compliant with home exercise program.  Response to previous treatment: no changes  Functional change: none    Pain: 0/10  Location: bilateral lower extremity neuropathy        Objective      n/a     Treatment     Eliezer received the treatments listed below:      therapeutic exercises to develop strength and endurance for 17 minutes including:     X 7 sit to stands with occasional upper extremity support   X 20 each shuttle mini squats and heel raises    X 10 minutes shuttle (level 3) as an adjunct to strength/balance training      neuromuscular re-education activities to improve: Balance for 24 minutes. The following activities were included:    X 45 seconds full Romberg stance, eyes open    X 15 each balance therapeutic exercise (no upper extremity support)* = heel raises/toe raises, mini squats   X 3 gait in square pattern (4 feet x 4 feet) while facing same direction*   X 15 four corners on command*   X 10 each stepping over 4-inch obstacle = forwards, sideways      *minimal difficulty       Patient Education and Home Exercises       Education provided:   - proper therapeutic exercise technique  - continue home exercise  program twice a day     Written Home Exercises Provided: Pt instructed to continue prior HEP.       Assessment     Patient was able to perform increased repetitions during sit to stands - occasional upper extremity support needed; static balance training progressed to full Romberg training, eyes open - no loss of balance noted; no upper extremity support used during balance therapeutic exercise - occasional loss of balance demonstrated; occasional loss of balance noted during four corners on command and during gait in square pattern.      Eliezer Is progressing fairly well towards her goals.   Patient prognosis is Fair.     Patient will continue to benefit from skilled outpatient physical therapy to address the deficits listed in the problem list box on initial evaluation, provide pt/family education and to maximize pt's level of independence in the home and community environment.     Patient's spiritual, cultural and educational needs considered and pt agreeable to plan of care and goals.     Anticipated barriers to physical therapy: severity of weakness and imbalance    Goals:      New Short Term Goals (4 Weeks):   Patient to report no new falls since assessment on 05/06/25. (PROGRESSING)  Patient to tolerate x 45 seconds full Romberg training, eyes open to improve upright tolerance. (PROGRESSING)  Patient to begin balance home exercise program. (NOT MET)     New Long Term Goals (8 Weeks):   Patient to demonstrate competence with home exercise program to maintain therapeutic gains. (NOT MET)  2.   Patient to ambulate 20 feet in less than 5.75 seconds to improve quinn/symmetry. (NOT MET)  3.   Patient to perform gait with eyes closed and minimal path deviation. (NOT MET)      Plan     Continue to progress strength/balance/mobility training to patient's tolerance.      Jose Martin Martini, PT      ..........................

## 2025-05-21 NOTE — PROGRESS NOTES
"  Outpatient Rehab    Speech-Language Pathology Discharge    Patient Name: Eliezer Au  MRN: [id number]  YOB: 1975  Encounter Date: 5/20/2025    Therapy Diagnosis:   Encounter Diagnosis   Name Primary?    Memory changes Yes     Physician: Carolann Bagley, NP    Physician Orders: Eval and Treat  Medical Diagnosis: Memory changes    Visit # / Visits Authorized: 7 / 20   Insurance Authorization Period: 1/23/2025 to 12/31/2025  Date of Evaluation: 1/24/2025   Plan of Care Certification: 4/9/2025 to 5/21/2025      Time In: 1550   Time Out: 1635  Total Time (in minutes): 45   Total Billable Time (in minutes): 45    Subjective   Pt arrived on time and reports doing well.         Objective            Treatment  Cognitive Skills  Activity 1: Pt recalled 4/4 WRAP memory strategies this date. "I am using htem in everday life, I use it in my scheduling, appointments, medicine, gardening, everything that I need to do, I am using them."  Met x2    Time Entry(in minutes):  Speech Treatment (Individual) Time Entry: 45    Assessment & Plan   Assessment  Majority of the session was spent reviewing the plan of care and discussing the patients perception of her progress. Patient reports noticeable improvement and states she is independently implementing strategies and education learned in therapy across home and community environments. She reports no new difficulties and expresses motivation to continue applying the strategies in her daily life. Patient has achieved maximal functional progress in speech therapy, and continued services are no longer warranted at this time. It was discussed that should new concerns or difficulties arise in the future, she may obtain a new referral to resume speech therapy services. Patient is in agreement with discharge. Patient discharged from speech therapy services  Evaluation/Treatment Tolerance: Patient tolerated treatment well    Patient's spiritual, cultural, and educational " needs considered and patient agreeable to plan of care and goals.          Plan  Discharge from speech therapy services          Goals:   Active       SLP       Short term goal 1 (Adequate for Care Transition)       Start:  01/24/25    Expected End:  05/21/25       Patient will recall 3/4 memory strategies independently 3 times overall and discuss how strategies are being implemented in the home and community.         Short term goal 2 (Adequate for Care Transition)       Start:  01/24/25    Expected End:  05/21/25       Patient will complete moderate level auditory memory tasks with 80% accuracy and minimal cues.          Short term goal 3 (Adequate for Care Transition)       Start:  01/24/25    Expected End:  05/21/25       Patient will complete sustained attention tasks with natural background noise with 90% accuracy independently.         Short term goal 4 (Adequate for Care Transition)       Start:  01/24/25    Expected End:  05/21/25       Patient will complete Goal-Plan-Action-Review with 80% accuracy independently.          Short term goal 5 (Adequate for Care Transition)       Start:  01/24/25    Expected End:  05/21/25       Patient will demonstrate awareness of cognitive-communication difficulties in 1-2 situations in her day in which cognitive deficits could or did compromise efficiency and performance with minimal cueing.          Long Term Goal (Adequate for Care Transition)       Start:  01/24/25    Expected End:  05/21/25       Patient will improve cognitive communication skills and develop appropriate compensatory strategies to improve ability to complete tasks with independence             Ramandeep Alanis CF-SLP

## 2025-05-23 ENCOUNTER — HOSPITAL ENCOUNTER (OUTPATIENT)
Dept: RADIOLOGY | Facility: HOSPITAL | Age: 50
Discharge: HOME | End: 2025-05-23
Attending: NURSE PRACTITIONER
Payer: MEDICAID

## 2025-05-23 DIAGNOSIS — R26.89 BALANCE PROBLEM: ICD-10-CM

## 2025-05-23 DIAGNOSIS — R20.0 ANESTHESIA OF SKIN: ICD-10-CM

## 2025-05-23 DIAGNOSIS — R29.6 FALLS: ICD-10-CM

## 2025-05-23 PROCEDURE — 72141 MRI NECK SPINE W/O DYE: CPT | Mod: 26,,, | Performed by: RADIOLOGY

## 2025-05-23 PROCEDURE — 72141 MRI NECK SPINE W/O DYE: CPT | Mod: TC,PO

## 2025-06-11 ENCOUNTER — CLINICAL SUPPORT (OUTPATIENT)
Dept: REHABILITATION | Facility: HOSPITAL | Age: 50
End: 2025-06-11
Payer: MEDICAID

## 2025-06-11 VITALS — HEART RATE: 64 BPM | DIASTOLIC BLOOD PRESSURE: 76 MMHG | SYSTOLIC BLOOD PRESSURE: 120 MMHG

## 2025-06-11 DIAGNOSIS — R26.89 IMBALANCE: ICD-10-CM

## 2025-06-11 DIAGNOSIS — R29.898 BILATERAL LEG WEAKNESS: Primary | ICD-10-CM

## 2025-06-11 PROCEDURE — 97110 THERAPEUTIC EXERCISES: CPT | Mod: PO,CQ

## 2025-06-11 NOTE — PROGRESS NOTES
Outpatient Rehab    Physical Therapy Visit    Patient Name: Eliezer Au  MRN: [id number]  YOB: 1975  Encounter Date: 6/11/2025    Therapy Diagnosis:   Encounter Diagnoses   Name Primary?    Bilateral leg weakness Yes    Imbalance      Physician: Carolann Bagley NP    Physician Orders: Eval and Treat  Medical Diagnosis: Balance problem  Surgical Diagnosis: Not applicable for this Episode   Surgical Date: Not applicable for this Episode    Visit # / Visits Authorized:  9 / 20  Insurance Authorization Period: 1/13/2025 to 12/31/2025  Date of Evaluation: 1/14/20251/14/2025   Plan of Care Certification: 5/7/2025 to 6/28/2025 5/6/2025  to 06/28/2025      PT/PTA: PTA   Number of PTA visits since last PT visit:1    Time In: 1505   Time Out: 1600  Total Time (in minutes): 55   Total Billable Time (in minutes): 27    FOTO:  Intake Score:  %  Survey Score 2:  %  Survey Score 3:  %    Precautions:     fall      Subjective   Had a fall once last week and had a couple of LOB that required a lot of effort to correct. No injuries excepts bruise on posterior leg and some scratches. States her MD says it is from her seizures. Had a seizure (silent) last week..  Pain reported as 0/10.      Objective   Vital Signs  /76   Pulse 64                         Treatment:  Therapeutic Exercise  TE 1: X 10 sit to stands with occasional upper extremity support              X 20 each shuttle mini squats and heel raises 50# 3 x 10,               X 10 minutes sci fit (level 3) as an adjunct to strength/balance training  TE 2: X 45 seconds full Romberg stance, eyes open               X 15 each balance therapeutic exercise (no upper extremity support)* = heel raises/toe raises, mini squats              X 3 gait in square pattern (4 feet x 4 feet) while facing same direction*              NP-X 15 four corners on command*              X 10 each stepping over 4-inch obstacle = forwards, sideways  [place] x 2 laps each  way,    Tandem walking x 2 laps each way.     Lateral walking x 2 laps each way,       Backward tandem x 2 laps each way,    CHARGES BASED ON 1-1 TX:  Time Entry(in minutes):  Therapeutic Exercise Time Entry: 55    Assessment & Plan   Assessment: Progressing well with balance and strength challenges with cues for safety and sequencing. Most challenged with navigating obstacles.  Evaluation/Treatment Tolerance: Patient tolerated treatment well    The patient will continue to benefit from skilled outpatient physical therapy in order to address the deficits listed in the problem list on the initial evaluation, provide patient and family education, and maximize the patients level of independence in the home and community environments.     The patient's spiritual, cultural, and educational needs were considered, and the patient is agreeable to the plan of care and goals.           Plan: Continue per POC, progressing as appropriate.    Goals: New Short Term Goals (4 Weeks):   Patient to report no new falls since assessment on 05/06/25. (PROGRESSING)  Patient to tolerate x 45 seconds full Romberg training, eyes open to improve upright tolerance. (PROGRESSING)  Patient to begin balance home exercise program. (NOT MET)     New Long Term Goals (8 Weeks):   Patient to demonstrate competence with home exercise program to maintain therapeutic gains. (NOT MET)  2.   Patient to ambulate 20 feet in less than 5.75 seconds to improve quinn/symmetry. (NOT MET)  3.   Patient to perform gait with eyes closed and minimal path deviation. (NOT MET)    Maya Augustin, PTA

## 2025-06-16 ENCOUNTER — LAB VISIT (OUTPATIENT)
Dept: LAB | Facility: HOSPITAL | Age: 50
End: 2025-06-16
Attending: NURSE PRACTITIONER
Payer: MEDICAID

## 2025-06-16 DIAGNOSIS — R41.3 MEMORY LOSS: Primary | ICD-10-CM

## 2025-06-16 PROCEDURE — 83519 RIA NONANTIBODY: CPT

## 2025-06-16 PROCEDURE — 84443 ASSAY THYROID STIM HORMONE: CPT

## 2025-06-16 PROCEDURE — 84425 ASSAY OF VITAMIN B-1: CPT

## 2025-06-16 PROCEDURE — 82746 ASSAY OF FOLIC ACID SERUM: CPT

## 2025-06-16 PROCEDURE — 82607 VITAMIN B-12: CPT

## 2025-06-16 PROCEDURE — 86592 SYPHILIS TEST NON-TREP QUAL: CPT

## 2025-06-16 PROCEDURE — 82140 ASSAY OF AMMONIA: CPT

## 2025-06-16 PROCEDURE — 85025 COMPLETE CBC W/AUTO DIFF WBC: CPT

## 2025-06-16 PROCEDURE — 36415 COLL VENOUS BLD VENIPUNCTURE: CPT | Mod: PO

## 2025-06-16 PROCEDURE — 84393 TAU PHOSPHORYLATED EA: CPT

## 2025-06-16 PROCEDURE — 80235 DRUG ASSAY LACOSAMIDE: CPT

## 2025-06-16 PROCEDURE — 80053 COMPREHEN METABOLIC PANEL: CPT

## 2025-06-17 LAB
ABSOLUTE EOSINOPHIL (OHS): 0.24 K/UL
ABSOLUTE MONOCYTE (OHS): 0.66 K/UL (ref 0.3–1)
ABSOLUTE NEUTROPHIL COUNT (OHS): 5.75 K/UL (ref 1.8–7.7)
ALBUMIN SERPL BCP-MCNC: 3.7 G/DL (ref 3.5–5.2)
ALP SERPL-CCNC: 116 UNIT/L (ref 40–150)
ALT SERPL W/O P-5'-P-CCNC: 16 UNIT/L (ref 10–44)
AMMONIA PLAS-SCNC: 44 UMOL/L (ref 10–50)
ANION GAP (OHS): 11 MMOL/L (ref 8–16)
AST SERPL-CCNC: 18 UNIT/L (ref 11–45)
BASOPHILS # BLD AUTO: 0.06 K/UL
BASOPHILS NFR BLD AUTO: 0.6 %
BILIRUB SERPL-MCNC: 0.2 MG/DL (ref 0.1–1)
BUN SERPL-MCNC: 11 MG/DL (ref 6–20)
CALCIUM SERPL-MCNC: 9.3 MG/DL (ref 8.7–10.5)
CHLORIDE SERPL-SCNC: 103 MMOL/L (ref 95–110)
CO2 SERPL-SCNC: 25 MMOL/L (ref 23–29)
CREAT SERPL-MCNC: 0.8 MG/DL (ref 0.5–1.4)
ERYTHROCYTE [DISTWIDTH] IN BLOOD BY AUTOMATED COUNT: 13.2 % (ref 11.5–14.5)
FOLATE SERPL-MCNC: 5.6 NG/ML (ref 4–24)
GFR SERPLBLD CREATININE-BSD FMLA CKD-EPI: >60 ML/MIN/1.73/M2
GLUCOSE SERPL-MCNC: 105 MG/DL (ref 70–110)
HCT VFR BLD AUTO: 40.3 % (ref 37–48.5)
HGB BLD-MCNC: 12.7 GM/DL (ref 12–16)
IMM GRANULOCYTES # BLD AUTO: 0.02 K/UL (ref 0–0.04)
IMM GRANULOCYTES NFR BLD AUTO: 0.2 % (ref 0–0.5)
LYMPHOCYTES # BLD AUTO: 2.99 K/UL (ref 1–4.8)
MCH RBC QN AUTO: 27.8 PG (ref 27–31)
MCHC RBC AUTO-ENTMCNC: 31.5 G/DL (ref 32–36)
MCV RBC AUTO: 88 FL (ref 82–98)
NUCLEATED RBC (/100WBC) (OHS): 0 /100 WBC
PLATELET # BLD AUTO: 239 K/UL (ref 150–450)
PMV BLD AUTO: 12 FL (ref 9.2–12.9)
POTASSIUM SERPL-SCNC: 4.2 MMOL/L (ref 3.5–5.1)
PROT SERPL-MCNC: 7.3 GM/DL (ref 6–8.4)
RBC # BLD AUTO: 4.57 M/UL (ref 4–5.4)
RELATIVE EOSINOPHIL (OHS): 2.5 %
RELATIVE LYMPHOCYTE (OHS): 30.8 % (ref 18–48)
RELATIVE MONOCYTE (OHS): 6.8 % (ref 4–15)
RELATIVE NEUTROPHIL (OHS): 59.1 % (ref 38–73)
RPR SER QL: NORMAL
SODIUM SERPL-SCNC: 139 MMOL/L (ref 136–145)
TSH SERPL-ACNC: 1.06 UIU/ML (ref 0.4–4)
VIT B12 SERPL-MCNC: 416 PG/ML (ref 210–950)
WBC # BLD AUTO: 9.72 K/UL (ref 3.9–12.7)

## 2025-06-19 LAB
IMMUNOLOGIST REVIEW: NORMAL
LACOSAMIDE SERPL-MCNC: 4.7 MCG/ML (ref 1–10)
M PHOSPHO-TAU 217: 0.09 PG/ML

## 2025-06-20 LAB — W VITAMIN B1: 51 UG/L

## 2025-06-24 ENCOUNTER — CLINICAL SUPPORT (OUTPATIENT)
Dept: REHABILITATION | Facility: HOSPITAL | Age: 50
End: 2025-06-24
Payer: MEDICAID

## 2025-06-24 DIAGNOSIS — R26.89 IMBALANCE: ICD-10-CM

## 2025-06-24 DIAGNOSIS — R29.898 BILATERAL LEG WEAKNESS: ICD-10-CM

## 2025-06-24 DIAGNOSIS — R26.89 BALANCE PROBLEM: Primary | ICD-10-CM

## 2025-06-24 PROCEDURE — 97110 THERAPEUTIC EXERCISES: CPT | Mod: PO

## 2025-06-24 NOTE — PROGRESS NOTES
Outpatient Rehab    Physical Therapy Discharge Note        Name: Eliezer FERRARA Au  Clinic Number: [id number]     Therapy Diagnosis:        Encounter Diagnoses   Name Primary?    Balance problem Yes    Bilateral leg weakness      Imbalance        Physician: Carolann Bagley NP     Visit Date: 06/24/2025     Physician Orders: physical therapy evaluation and treat; vestibular rehab therapy   Medical Diagnosis from Referral: balance problem  Evaluation Date: 1/14/2025    Date of Last visit: 06/24/2025  Total Visits Received: 11     Time In: 1645  Time Out: 1725  Total Billable Time: 40 minutes     Precautions: Fall  Functional Level Prior to Evaluation:  supervision needed        Subjective      Patient reports: taking a fall yesterday on her stairs - states she twisted her left ankle.     She was compliant with home exercise program.  Response to previous treatment: decreased stability  Functional change: none     Pain: moderate left ankle        Objective       Functional Gait Assessment   1. Gait on level surface =  1  2. Change in Gait Speed = 1  3. Gait with horizontal head turns  = 3  4. Gait with vertical head turns = 3  5. Gait with pivot turns = 2  6. Step over obstacle = 1  7. Gait with Narrow VAIBHAV = 1  8. Gait with eyes closed = 0  9. Ambulating Backwards = 2  10. Steps = 1     Score 15/30   Score:   <22/30 fall risk   <20/30 fall risk in older adults   <18/30 fall risk in Parkinsons        Treatment      Eliezer received the treatments listed below:       neuromuscular re-education activities to improve: Balance for 40 minutes. The following activities were included:                 X 45 seconds full Romberg stance training = eyes open  Functional ambulation 2 x 20 feet at normal speed (avg 8.30 seconds)  2 X 20 feet ambulation with changing speeds (slow and fast)**  X 20 feet ambulation with head turns  X 20 feet ambulation with head nods  X 20 feet ambulation with pivot turns*  X 20 feet ambulation with  stepping over obstacle (4 1/2 inch* and 9 inch*)  3 X 4-7 feet ambulation heel-toe**  X 20 feet ambulation with eyes closed**  X 20 feet ambulation backwards*  X 4 up/down 6-inch stairs - step to gait and with use of hand rails  Provided patient with balance home exercise program - instructed to perform twice a day       *minimal difficulty     **moderate difficulty        Patient Education and Home Exercises        Education provided:   - proper therapeutic exercise technique  - continue home exercise program twice a day      Written Home Exercises Provided: Pt instructed to continue prior HEP.         Assessment      Patient did not demonstrate sway during full Romberg training, eyes closed; only minor change in velocity noted during gait with changing speeds; patient took longer than 3 seconds to perform pivot turn; Eliezer needed to slow down before stepping over both obstacles; occasional loss of balance demonstrated during heel-toe gait; severe path deviation noted during gait with eyes closed; decreased velocity demonstrated during backwards gait; elevated fall risk as evidenced by score of 15/30 during Functional Gait Assessment (18/30 at last assessment).      Eliezer has progressed fairly well towards her goals.      Patient's spiritual, cultural and educational needs considered and pt agreeable to plan of care and goals.     Goals:      New Short Term Goals (4 Weeks):   Patient to report no new falls since assessment on 05/06/25. (NOT MET)  Patient to tolerate x 45 seconds full Romberg training, eyes open to improve upright tolerance. (MET)  Patient to begin balance home exercise program. (ONGOING)     New Long Term Goals (8 Weeks):   Patient to demonstrate competence with home exercise program to maintain therapeutic gains. (ONGOING)  2.   Patient to ambulate 20 feet in less than 5.75 seconds to improve quinn/symmetry. (NOT MET)  3.   Patient to perform gait with eyes closed and minimal path deviation.  (NOT MET)    Discharge reason: Patient has reached the maximum rehab potential for the present time.    Discharge FOTO Score: 45/77        Plan      This patient is discharged from Physical Therapy.      Jose Martin Martini, PT